# Patient Record
Sex: FEMALE | Race: WHITE | Employment: OTHER | URBAN - METROPOLITAN AREA
[De-identification: names, ages, dates, MRNs, and addresses within clinical notes are randomized per-mention and may not be internally consistent; named-entity substitution may affect disease eponyms.]

---

## 2017-01-17 ENCOUNTER — HOSPITAL ENCOUNTER (OUTPATIENT)
Dept: LAB | Age: 67
Discharge: HOME | End: 2017-01-17
Payer: MEDICARE

## 2017-01-17 DIAGNOSIS — C50.911 MALIGNANT NEOPLASM OF RIGHT FEMALE BREAST, UNSPECIFIED SITE OF BREAST: ICD-10-CM

## 2017-01-17 DIAGNOSIS — Z63.4 BEREAVEMENT: ICD-10-CM

## 2017-01-17 LAB
ALBUMIN SERPL BCP-MCNC: 3.8 G/DL (ref 3.2–4.6)
ALBUMIN/GLOB SERPL: 1.1 {RATIO} (ref 1.2–3.5)
ALP SERPL-CCNC: 83 U/L (ref 50–136)
ALT SERPL-CCNC: 26 U/L (ref 12–65)
ANION GAP BLD CALC-SCNC: 4 MMOL/L (ref 7–16)
AST SERPL W P-5'-P-CCNC: 20 U/L (ref 15–37)
BASOPHILS # BLD AUTO: 0 K/UL (ref 0–0.2)
BASOPHILS # BLD: 1 % (ref 0–2)
BILIRUB SERPL-MCNC: 0.5 MG/DL (ref 0.2–1.1)
BUN SERPL-MCNC: 21 MG/DL (ref 8–23)
CALCIUM SERPL-MCNC: 9.1 MG/DL (ref 8.3–10.4)
CANCER AG15-3 SERPL-ACNC: 13.1 U/ML (ref 1–35)
CHLORIDE SERPL-SCNC: 108 MMOL/L (ref 98–107)
CO2 SERPL-SCNC: 29 MMOL/L (ref 23–32)
CREAT SERPL-MCNC: 1.16 MG/DL (ref 0.6–1)
DIFFERENTIAL METHOD BLD: ABNORMAL
EOSINOPHIL # BLD: 0.2 K/UL (ref 0–0.8)
EOSINOPHIL NFR BLD: 5 % (ref 0.5–7.8)
ERYTHROCYTE [DISTWIDTH] IN BLOOD BY AUTOMATED COUNT: 12.6 % (ref 11.9–14.6)
GLOBULIN SER CALC-MCNC: 3.4 G/DL (ref 2.3–3.5)
GLUCOSE SERPL-MCNC: 129 MG/DL (ref 65–100)
HCT VFR BLD AUTO: 34 % (ref 35.8–46.3)
HGB BLD-MCNC: 11.1 G/DL (ref 11.7–15.4)
LYMPHOCYTES # BLD AUTO: 31 % (ref 13–44)
LYMPHOCYTES # BLD: 1.6 K/UL (ref 0.5–4.6)
MCH RBC QN AUTO: 31.7 PG (ref 26.1–32.9)
MCHC RBC AUTO-ENTMCNC: 32.6 G/DL (ref 31.4–35)
MCV RBC AUTO: 97.1 FL (ref 79.6–97.8)
MONOCYTES # BLD: 0.3 K/UL (ref 0.1–1.3)
MONOCYTES NFR BLD AUTO: 6 % (ref 4–12)
NEUTS SEG # BLD: 3 K/UL (ref 1.7–8.2)
NEUTS SEG NFR BLD AUTO: 58 % (ref 43–78)
NRBC # BLD: 0 K/UL (ref 0–0.2)
PLATELET # BLD AUTO: 159 K/UL (ref 150–450)
PMV BLD AUTO: 9.3 FL (ref 10.8–14.1)
POTASSIUM SERPL-SCNC: 4.3 MMOL/L (ref 3.5–5.1)
PROT SERPL-MCNC: 7.2 G/DL (ref 6.3–8.2)
RBC # BLD AUTO: 3.5 M/UL (ref 4.05–5.25)
SODIUM SERPL-SCNC: 141 MMOL/L (ref 136–145)
WBC # BLD AUTO: 5.1 K/UL (ref 4.3–11.1)

## 2017-01-17 PROCEDURE — 85025 COMPLETE CBC W/AUTO DIFF WBC: CPT | Performed by: INTERNAL MEDICINE

## 2017-01-17 PROCEDURE — 86300 IMMUNOASSAY TUMOR CA 15-3: CPT | Performed by: INTERNAL MEDICINE

## 2017-01-17 PROCEDURE — 80053 COMPREHEN METABOLIC PANEL: CPT | Performed by: INTERNAL MEDICINE

## 2017-01-17 PROCEDURE — 36415 COLL VENOUS BLD VENIPUNCTURE: CPT | Performed by: INTERNAL MEDICINE

## 2017-01-17 SDOH — SOCIAL STABILITY - SOCIAL INSECURITY: DISSAPEARANCE AND DEATH OF FAMILY MEMBER: Z63.4

## 2017-01-18 LAB — CANCER AG27-29 SERPL-ACNC: 23.4 U/ML (ref 0–38.6)

## 2017-07-21 ENCOUNTER — HOSPITAL ENCOUNTER (OUTPATIENT)
Dept: MAMMOGRAPHY | Age: 67
Discharge: HOME | End: 2017-07-21
Attending: INTERNAL MEDICINE
Payer: MEDICARE

## 2017-07-21 DIAGNOSIS — C50.911 MALIGNANT NEOPLASM OF RIGHT FEMALE BREAST, UNSPECIFIED SITE OF BREAST: ICD-10-CM

## 2017-07-21 DIAGNOSIS — Z12.31 ENCOUNTER FOR SCREENING MAMMOGRAM FOR BREAST CANCER: ICD-10-CM

## 2017-07-21 PROCEDURE — 77067 SCR MAMMO BI INCL CAD: CPT

## 2017-07-24 ENCOUNTER — HOSPITAL ENCOUNTER (OUTPATIENT)
Dept: LAB | Age: 67
Discharge: HOME | End: 2017-07-24
Payer: MEDICARE

## 2017-07-24 DIAGNOSIS — Z12.31 ENCOUNTER FOR SCREENING MAMMOGRAM FOR BREAST CANCER: ICD-10-CM

## 2017-07-24 DIAGNOSIS — C50.911 MALIGNANT NEOPLASM OF RIGHT FEMALE BREAST, UNSPECIFIED SITE OF BREAST: ICD-10-CM

## 2017-07-24 LAB
ALBUMIN SERPL BCP-MCNC: 4.1 G/DL (ref 3.2–4.6)
ALBUMIN/GLOB SERPL: 1.1 {RATIO} (ref 1.2–3.5)
ALP SERPL-CCNC: 99 U/L (ref 50–136)
ALT SERPL-CCNC: 27 U/L (ref 12–65)
ANION GAP BLD CALC-SCNC: 2 MMOL/L (ref 7–16)
AST SERPL W P-5'-P-CCNC: 22 U/L (ref 15–37)
BASOPHILS # BLD AUTO: 0 K/UL (ref 0–0.2)
BASOPHILS # BLD: 1 % (ref 0–2)
BILIRUB SERPL-MCNC: 0.6 MG/DL (ref 0.2–1.1)
BUN SERPL-MCNC: 23 MG/DL (ref 8–23)
CALCIUM SERPL-MCNC: 9.5 MG/DL (ref 8.3–10.4)
CANCER AG15-3 SERPL-ACNC: 14.5 U/ML (ref 1–35)
CHLORIDE SERPL-SCNC: 105 MMOL/L (ref 98–107)
CO2 SERPL-SCNC: 32 MMOL/L (ref 21–32)
CREAT SERPL-MCNC: 1.1 MG/DL (ref 0.6–1)
DIFFERENTIAL METHOD BLD: ABNORMAL
EOSINOPHIL # BLD: 0.2 K/UL (ref 0–0.8)
EOSINOPHIL NFR BLD: 5 % (ref 0.5–7.8)
ERYTHROCYTE [DISTWIDTH] IN BLOOD BY AUTOMATED COUNT: 12.3 % (ref 11.9–14.6)
GLOBULIN SER CALC-MCNC: 3.6 G/DL (ref 2.3–3.5)
GLUCOSE SERPL-MCNC: 92 MG/DL (ref 65–100)
HCT VFR BLD AUTO: 35.5 % (ref 35.8–46.3)
HGB BLD-MCNC: 12 G/DL (ref 11.7–15.4)
LYMPHOCYTES # BLD AUTO: 32 % (ref 13–44)
LYMPHOCYTES # BLD: 1.6 K/UL (ref 0.5–4.6)
MCH RBC QN AUTO: 32.1 PG (ref 26.1–32.9)
MCHC RBC AUTO-ENTMCNC: 33.8 G/DL (ref 31.4–35)
MCV RBC AUTO: 94.9 FL (ref 79.6–97.8)
MONOCYTES # BLD: 0.4 K/UL (ref 0.1–1.3)
MONOCYTES NFR BLD AUTO: 9 % (ref 4–12)
NEUTS SEG # BLD: 2.7 K/UL (ref 1.7–8.2)
NEUTS SEG NFR BLD AUTO: 54 % (ref 43–78)
NRBC # BLD: 0 K/UL (ref 0–0.2)
PLATELET # BLD AUTO: 190 K/UL (ref 150–450)
PMV BLD AUTO: 9.8 FL (ref 10.8–14.1)
POTASSIUM SERPL-SCNC: 4.6 MMOL/L (ref 3.5–5.1)
PROT SERPL-MCNC: 7.7 G/DL (ref 6.3–8.2)
RBC # BLD AUTO: 3.74 M/UL (ref 4.05–5.25)
SODIUM SERPL-SCNC: 139 MMOL/L (ref 136–145)
WBC # BLD AUTO: 4.9 K/UL (ref 4.3–11.1)

## 2017-07-24 PROCEDURE — 86300 IMMUNOASSAY TUMOR CA 15-3: CPT | Performed by: INTERNAL MEDICINE

## 2017-07-24 PROCEDURE — 85025 COMPLETE CBC W/AUTO DIFF WBC: CPT | Performed by: INTERNAL MEDICINE

## 2017-07-24 PROCEDURE — 36415 COLL VENOUS BLD VENIPUNCTURE: CPT | Performed by: INTERNAL MEDICINE

## 2017-07-24 PROCEDURE — 80053 COMPREHEN METABOLIC PANEL: CPT | Performed by: INTERNAL MEDICINE

## 2017-07-25 LAB — CANCER AG27-29 SERPL-ACNC: 24.3 U/ML (ref 0–38.6)

## 2017-08-01 ENCOUNTER — HOSPITAL ENCOUNTER (OUTPATIENT)
Dept: MAMMOGRAPHY | Age: 67
Discharge: HOME | End: 2017-08-01
Attending: INTERNAL MEDICINE
Payer: MEDICARE

## 2017-08-01 DIAGNOSIS — Z78.0 POST-MENOPAUSAL: ICD-10-CM

## 2017-08-01 DIAGNOSIS — C50.919 MALIGNANT NEOPLASM OF FEMALE BREAST, UNSPECIFIED LATERALITY, UNSPECIFIED SITE OF BREAST: ICD-10-CM

## 2017-08-01 PROCEDURE — 77080 DXA BONE DENSITY AXIAL: CPT

## 2017-09-29 ENCOUNTER — HOSPITAL ENCOUNTER (OUTPATIENT)
Dept: LAB | Age: 67
Discharge: HOME | End: 2017-09-29
Payer: MEDICARE

## 2017-09-29 DIAGNOSIS — R07.9 CHEST PAIN, UNSPECIFIED TYPE: ICD-10-CM

## 2017-09-29 DIAGNOSIS — R06.02 SHORTNESS OF BREATH: ICD-10-CM

## 2017-09-29 LAB — D DIMER PPP FEU-MCNC: 0.47 UG/ML(FEU)

## 2017-09-29 PROCEDURE — 36415 COLL VENOUS BLD VENIPUNCTURE: CPT | Performed by: INTERNAL MEDICINE

## 2017-09-29 PROCEDURE — 85379 FIBRIN DEGRADATION QUANT: CPT | Performed by: INTERNAL MEDICINE

## 2017-10-03 ENCOUNTER — HOSPITAL ENCOUNTER (EMERGENCY)
Age: 67
Discharge: HOME | End: 2017-10-03
Attending: EMERGENCY MEDICINE
Payer: MEDICARE

## 2017-10-03 ENCOUNTER — APPOINTMENT (OUTPATIENT)
Dept: GENERAL RADIOLOGY | Age: 67
End: 2017-10-03
Payer: MEDICARE

## 2017-10-03 ENCOUNTER — APPOINTMENT (OUTPATIENT)
Dept: CT IMAGING | Age: 67
End: 2017-10-03
Attending: EMERGENCY MEDICINE
Payer: MEDICARE

## 2017-10-03 VITALS
TEMPERATURE: 98.3 F | OXYGEN SATURATION: 98 % | HEIGHT: 60 IN | HEART RATE: 55 BPM | SYSTOLIC BLOOD PRESSURE: 156 MMHG | RESPIRATION RATE: 16 BRPM | WEIGHT: 140 LBS | BODY MASS INDEX: 27.48 KG/M2 | DIASTOLIC BLOOD PRESSURE: 65 MMHG

## 2017-10-03 DIAGNOSIS — R07.9 ACUTE CHEST PAIN: Primary | ICD-10-CM

## 2017-10-03 LAB
ALBUMIN SERPL-MCNC: 4.1 G/DL (ref 3.2–4.6)
ALBUMIN/GLOB SERPL: 1.1 {RATIO} (ref 1.2–3.5)
ALP SERPL-CCNC: 82 U/L (ref 50–136)
ALT SERPL-CCNC: 26 U/L (ref 12–65)
ANION GAP SERPL CALC-SCNC: 10 MMOL/L (ref 7–16)
AST SERPL-CCNC: 24 U/L (ref 15–37)
BASOPHILS # BLD: 0 K/UL (ref 0–0.2)
BASOPHILS NFR BLD: 0 % (ref 0–2)
BILIRUB SERPL-MCNC: 0.6 MG/DL (ref 0.2–1.1)
BNP SERPL-MCNC: 4 PG/ML
BUN SERPL-MCNC: 26 MG/DL (ref 8–23)
CALCIUM SERPL-MCNC: 9.2 MG/DL (ref 8.3–10.4)
CHLORIDE SERPL-SCNC: 104 MMOL/L (ref 98–107)
CO2 SERPL-SCNC: 24 MMOL/L (ref 21–32)
CREAT SERPL-MCNC: 1.4 MG/DL (ref 0.6–1)
D DIMER PPP FEU-MCNC: 0.63 UG/ML(FEU)
DIFFERENTIAL METHOD BLD: ABNORMAL
EOSINOPHIL # BLD: 0.2 K/UL (ref 0–0.8)
EOSINOPHIL NFR BLD: 4 % (ref 0.5–7.8)
ERYTHROCYTE [DISTWIDTH] IN BLOOD BY AUTOMATED COUNT: 12.4 % (ref 11.9–14.6)
GLOBULIN SER CALC-MCNC: 3.7 G/DL (ref 2.3–3.5)
GLUCOSE SERPL-MCNC: 101 MG/DL (ref 65–100)
HCT VFR BLD AUTO: 35 % (ref 35.8–46.3)
HGB BLD-MCNC: 12.3 G/DL (ref 11.7–15.4)
IMM GRANULOCYTES # BLD: 0 K/UL (ref 0–0.5)
IMM GRANULOCYTES NFR BLD: 0.2 % (ref 0–5)
LYMPHOCYTES # BLD: 1.9 K/UL (ref 0.5–4.6)
LYMPHOCYTES NFR BLD: 32 % (ref 13–44)
MCH RBC QN AUTO: 32.6 PG (ref 26.1–32.9)
MCHC RBC AUTO-ENTMCNC: 35.1 G/DL (ref 31.4–35)
MCV RBC AUTO: 92.8 FL (ref 79.6–97.8)
MONOCYTES # BLD: 0.6 K/UL (ref 0.1–1.3)
MONOCYTES NFR BLD: 10 % (ref 4–12)
NEUTS SEG # BLD: 3.1 K/UL (ref 1.7–8.2)
NEUTS SEG NFR BLD: 54 % (ref 43–78)
PLATELET # BLD AUTO: 214 K/UL (ref 150–450)
PMV BLD AUTO: 10 FL (ref 10.8–14.1)
POTASSIUM SERPL-SCNC: 4.9 MMOL/L (ref 3.5–5.1)
PROT SERPL-MCNC: 7.8 G/DL (ref 6.3–8.2)
RBC # BLD AUTO: 3.77 M/UL (ref 4.05–5.25)
SODIUM SERPL-SCNC: 138 MMOL/L (ref 136–145)
TROPONIN I SERPL-MCNC: <0.02 NG/ML (ref 0.02–0.05)
TROPONIN I SERPL-MCNC: <0.02 NG/ML (ref 0.02–0.05)
WBC # BLD AUTO: 5.8 K/UL (ref 4.3–11.1)

## 2017-10-03 PROCEDURE — 71020 XR CHEST PA LAT: CPT

## 2017-10-03 PROCEDURE — 84484 ASSAY OF TROPONIN QUANT: CPT | Performed by: PHYSICIAN ASSISTANT

## 2017-10-03 PROCEDURE — 93005 ELECTROCARDIOGRAM TRACING: CPT | Performed by: PHYSICIAN ASSISTANT

## 2017-10-03 PROCEDURE — 85025 COMPLETE CBC W/AUTO DIFF WBC: CPT | Performed by: PHYSICIAN ASSISTANT

## 2017-10-03 PROCEDURE — 83880 ASSAY OF NATRIURETIC PEPTIDE: CPT | Performed by: PHYSICIAN ASSISTANT

## 2017-10-03 PROCEDURE — 80053 COMPREHEN METABOLIC PANEL: CPT | Performed by: PHYSICIAN ASSISTANT

## 2017-10-03 PROCEDURE — 85379 FIBRIN DEGRADATION QUANT: CPT | Performed by: PHYSICIAN ASSISTANT

## 2017-10-03 PROCEDURE — 99285 EMERGENCY DEPT VISIT HI MDM: CPT | Performed by: EMERGENCY MEDICINE

## 2017-10-03 NOTE — ED PROVIDER NOTES
Patient is a 69 y.o. female presenting with chest pain. The history is provided by the patient. Chest Pain (Angina)    This is a new problem. The current episode started 3 to 5 hours ago. The problem has been gradually improving. Duration of episode(s) is 3 hours. The problem occurs rarely. The pain is associated with normal activity. The pain is present in the substernal region. The pain is at a severity of 7/10. The quality of the pain is described as pressure-like. The pain does not radiate. Exacerbated by: nothing. Pertinent negatives include no abdominal pain, no back pain, no claudication, no cough, no diaphoresis, no dizziness, no exertional chest pressure, no fever, no headaches, no hemoptysis, no irregular heartbeat, no leg pain, no lower extremity edema, no malaise/fatigue, no nausea, no near-syncope, no numbness, no orthopnea, no palpitations, no PND, no shortness of breath, no sputum production, no vomiting and no weakness. She has tried rest for the symptoms. The treatment provided no relief. Risk factors include hypertension. Her past medical history is significant for cancer, DVT and HTN. Her past medical history does not include aneurysm, DM, PE or CHF. Procedural history includes cardiac catheterization (2015) and echocardiogram (yesterday). Pertinent negatives include no cardiac stents.        Past Medical History:   Diagnosis Date    Acute cystitis     Acute venous embolism and thrombosis of deep vessels of distal lower extremity (HCC)     Allergic rhinitis     takes allegra daily    Anemia 10/5/2015    Arthritis     knees    Asthma     \"intrinsic asthma\" allergic to certain odors (no inhalers)    Bladder neck obstruction     Breast cancer ([address] dx 7/14    right breast lumpectomy    Cancer ([address]     Cardiomyopathy ([address]     Chronic pain     Coagulation disorder ([address]     anemia    Cystocele, midline     Depression 10/5/2015    Dyspnea 10/5/2015    Dysuria     Female stress incontinence     Female stress incontinence     Heart failure ([facility]     History of anemia 2013    History of Clostridium difficile infection 2006    History of gestational diabetes     History of vertigo     last episode 10/14/14; takes meclizine prn    Hyperactivity of bladder     takes med    Hypertension     controlled by medication    Ill-defined condition     Incomplete bladder emptying     Increased urinary frequency     takes med    Other chronic cystitis     Post-operative nausea and vomiting     many yrs ago after surgery    Postmenopausal atrophic vaginitis     Psychiatric disorder     cymbalta for stress & headaches    Seizures ([facility]     \"convusions at age 1\"  ? ?cause ? ?febrile seizure    Stress incontinence 10/5/2015    Thromboembolus ([facility] 1997, 2002    left leg \"DVT treated with elevation\"  \"no meds\"    Thromboembolus ([facility] 2006    right leg s/p cystocele (tx coumadin x 6 months    Urge incontinence     Urinary tract infection, site not specified     UTI (urinary tract infection)        Past Surgical History:   Procedure Laterality Date    BREAST SURGERY PROCEDURE UNLISTED Left 1994    breast benign lumpectomy    HX BREAST BIOPSY Right 7/14    HX BREAST LUMPECTOMY  8/15/2014    RIGHT BREAST NEEDLE LOCALIZED LUMPECTOMY    PREOP @ 10:00 / NUC MED 11:45 / NEEDLE LOC 2:00 / SURGERY @ 4:30  performed by Lor Verduzco MD at [facility] HX HERNIA REPAIR Right 2002    ventral    HX HYSTERECTOMY  1973    vaginal    HX OTHER SURGICAL      PORT PLACEMENT    HX UROLOGICAL  2006    cystocele repair    HX UROLOGICAL  2000    cystocele repair    HX UROLOGICAL  2001    adhesions and nerve pain due to cystocele    HX UROLOGICAL  2005    cystocele and rectocele repair    HX UROLOGICAL      vaginal sling urethropexy    HX VASCULAR ACCESS           Family History:   Problem Relation Age of Onset    Heart Disease Father     Hypertension Father     Coronary Artery Disease Father  Cancer Sister     Breast Cancer Sister     Heart Disease Sister     Heart Disease Mother     Hypertension Other      gen fam hx    Diabetes Other      gen fam hx    Heart Disease Other      gen fam hx       Social History     Social History    Marital status:      Spouse name: N/A    Number of children: N/A    Years of education: N/A     Occupational History    Not on file. Social History Main Topics    Smoking status: Never Smoker    Smokeless tobacco: Never Used    Alcohol use 0.5 oz/week     1 Glasses of wine per week      Comment: \"very seldom\"    Drug use: No    Sexual activity: Not on file     Other Topics Concern    Not on file     Social History Narrative         ALLERGIES: Avc vaginal [sulfanilamide]; Benadryl [diphenhydramine hcl]; Clindamycin; Doxycycline; Elavil; Ibuprofen; Keflex [cephalexin]; Macrobid [nitrofurantoin monohyd/m-cryst]; Other plant, animal, environmental; Pcn [penicillins]; Sulfa (sulfonamide antibiotics); and Trimethoprim    Review of Systems   Constitutional: Negative for diaphoresis, fever and malaise/fatigue. Respiratory: Negative for cough, hemoptysis, sputum production and shortness of breath. Cardiovascular: Positive for chest pain. Negative for palpitations, orthopnea, claudication, PND and near-syncope. Gastrointestinal: Negative for abdominal pain, nausea and vomiting. Musculoskeletal: Negative for back pain. Neurological: Negative for dizziness, weakness, numbness and headaches. All other systems reviewed and are negative. Vitals:    10/03/17 1607 10/03/17 1621 10/03/17 1808   BP:  103/69 115/57   Pulse: 70     Resp: 16     Temp: 98.3 °F (36.8 °C)     SpO2: 98%     Weight: 63.5 kg (140 lb)     Height: 5' (1.524 m)              Physical Exam   Constitutional: She is oriented to person, place, and time. She appears well-developed and well-nourished. No distress. HENT:   Head: Normocephalic and atraumatic.    Right Ear: Tympanic membrane and external ear normal.   Left Ear: Tympanic membrane and external ear normal.   Mouth/Throat: Oropharynx is clear and moist.   Eyes: Conjunctivae and EOM are normal. Pupils are equal, round, and reactive to light. Neck: Normal range of motion. Neck supple. No tracheal deviation present. Cardiovascular: Normal rate, regular rhythm, normal heart sounds and intact distal pulses. Exam reveals no gallop and no friction rub. No murmur heard. Pulmonary/Chest: Effort normal and breath sounds normal. No respiratory distress. She has no wheezes. Abdominal: Soft. Bowel sounds are normal. She exhibits no distension and no mass. There is no hepatosplenomegaly. There is no tenderness. There is no rebound and no guarding. Musculoskeletal: Normal range of motion. She exhibits no edema. Lymphadenopathy:     She has no cervical adenopathy. Neurological: She is alert and oriented to person, place, and time. She displays normal reflexes. No cranial nerve deficit. Skin: Skin is warm and dry. No rash noted. She is not diaphoretic. No erythema. Psychiatric: She has a normal mood and affect. Nursing note and vitals reviewed. MDM  Number of Diagnoses or Management Options  Acute chest pain: new and requires workup     Amount and/or Complexity of Data Reviewed  Clinical lab tests: ordered and reviewed  Tests in the radiology section of CPT®: ordered and reviewed  Decide to obtain previous medical records or to obtain history from someone other than the patient: yes  Review and summarize past medical records: yes (8/19/15 patient had a normal heart catheterization except for mildly elevated pulmonary pressures.   Echocardiogram done yesterday revealed an EF of 50% and was otherwise unremarkable.)  Independent visualization of images, tracings, or specimens: yes    Risk of Complications, Morbidity, and/or Mortality  Presenting problems: high  Diagnostic procedures: moderate  Management options: moderate    Patient Progress  Patient progress: improved    ED Course   Comment By Time   Lissette Moore is a 69 y.o. female here for chest pain/pressure. History of decreased ejection fracture after chemo 2 years ago, normal now. Rapid assessment performed. --- Orders were placed. --- Patient will be roomed. I have seen and briefly evaluated the patient in triage in order to expedite their workup and treatment as defined by the department policy and procedure. Their care will be completed in the emergency department by another provider. The work up will not be complete until this further evaluation is completed by another provider. Signed By: JAS Vallejo    October 3, 2017 [facility]803       Procedures    The patient was observed in the ED. Chest pain is atypical and the d-dimer when age-adjusted is normal.  Plan is to have the patient follow-up with her family doctor tomorrow with a phone call.     Results Reviewed:      Recent Results (from the past 24 hour(s))   EKG, 12 LEAD, INITIAL    Collection Time: 10/03/17  4:04 PM   Result Value Ref Range    Ventricular Rate 69 BPM    Atrial Rate 69 BPM    P-R Interval 144 ms    QRS Duration 80 ms    Q-T Interval 402 ms    QTC Calculation (Bezet) 430 ms    Calculated P Axis 55 degrees    Calculated R Axis 32 degrees    Calculated T Axis 67 degrees    Diagnosis       Normal sinus rhythm  Normal ECG  When compared with ECG of 15-AUG-2015 12:54,  No significant change was found     CBC WITH AUTOMATED DIFF    Collection Time: 10/03/17  4:19 PM   Result Value Ref Range    WBC 5.8 4.3 - 11.1 K/uL    RBC 3.77 (L) 4.05 - 5.25 M/uL    HGB 12.3 11.7 - 15.4 g/dL    HCT 35.0 (L) 35.8 - 46.3 %    MCV 92.8 79.6 - 97.8 FL    MCH 32.6 26.1 - 32.9 PG    MCHC 35.1 (H) 31.4 - 35.0 g/dL    RDW 12.4 11.9 - 14.6 %    PLATELET 861 424 - 912 K/uL    MPV 10.0 (L) 10.8 - 14.1 FL    DF AUTOMATED      NEUTROPHILS 54 43 - 78 %    LYMPHOCYTES 32 13 - 44 %    MONOCYTES 10 4.0 - 12.0 %    EOSINOPHILS 4 0.5 - 7.8 %    BASOPHILS 0 0.0 - 2.0 %    IMMATURE GRANULOCYTES 0.2 0.0 - 5.0 %    ABS. NEUTROPHILS 3.1 1.7 - 8.2 K/UL    ABS. LYMPHOCYTES 1.9 0.5 - 4.6 K/UL    ABS. MONOCYTES 0.6 0.1 - 1.3 K/UL    ABS. EOSINOPHILS 0.2 0.0 - 0.8 K/UL    ABS. BASOPHILS 0.0 0.0 - 0.2 K/UL    ABS. IMM. GRANS. 0.0 0.0 - 0.5 K/UL   METABOLIC PANEL, COMPREHENSIVE    Collection Time: 10/03/17  4:19 PM   Result Value Ref Range    Sodium 138 136 - 145 mmol/L    Potassium 4.9 3.5 - 5.1 mmol/L    Chloride 104 98 - 107 mmol/L    CO2 24 21 - 32 mmol/L    Anion gap 10 7 - 16 mmol/L    Glucose 101 (H) 65 - 100 mg/dL    BUN 26 (H) 8 - 23 MG/DL    Creatinine 1.40 (H) 0.6 - 1.0 MG/DL    GFR est AA 48 (L) >60 ml/min/1.73m2    GFR est non-AA 40 (L) >60 ml/min/1.73m2    Calcium 9.2 8.3 - 10.4 MG/DL    Bilirubin, total 0.6 0.2 - 1.1 MG/DL    ALT (SGPT) 26 12 - 65 U/L    AST (SGOT) 24 15 - 37 U/L    Alk. phosphatase 82 50 - 136 U/L    Protein, total 7.8 6.3 - 8.2 g/dL    Albumin 4.1 3.2 - 4.6 g/dL    Globulin 3.7 (H) 2.3 - 3.5 g/dL    A-G Ratio 1.1 (L) 1.2 - 3.5     TROPONIN I    Collection Time: 10/03/17  4:19 PM   Result Value Ref Range    Troponin-I, Qt. <0.02 (L) 0.02 - 0.05 NG/ML   BNP    Collection Time: 10/03/17  4:19 PM   Result Value Ref Range    BNP 4 pg/mL   D DIMER    Collection Time: 10/03/17  4:20 PM   Result Value Ref Range    D DIMER 0.63 (HH) <0.55 ug/ml(FEU)   TROPONIN I    Collection Time: 10/03/17  7:48 PM   Result Value Ref Range    Troponin-I, Qt. <0.02 (L) 0.02 - 0.05 NG/ML       I discussed the results of all labs, procedures, radiographs, and treatments with the patient and available family. Treatment plan is agreed upon and the patient is ready for discharge. All voiced understanding of the discharge plan and medication instructions or changes as appropriate. Questions about treatment in the ED were answered.   All were encouraged to return should symptoms worsen or new problems develop.

## 2017-10-03 NOTE — ED TRIAGE NOTES
Pt. Complains of chest pain, states it feels like a \"weight\" is on her chest. Pt. Complains of some shortness of breath at times.

## 2017-10-04 LAB
ATRIAL RATE: 69 BPM
CALCULATED P AXIS, ECG09: 55 DEGREES
CALCULATED R AXIS, ECG10: 32 DEGREES
CALCULATED T AXIS, ECG11: 67 DEGREES
DIAGNOSIS, 93000: NORMAL
P-R INTERVAL, ECG05: 144 MS
Q-T INTERVAL, ECG07: 402 MS
QRS DURATION, ECG06: 80 MS
QTC CALCULATION (BEZET), ECG08: 430 MS
VENTRICULAR RATE, ECG03: 69 BPM

## 2017-10-04 NOTE — DISCHARGE INSTRUCTIONS
Chest Pain: Care Instructions  Your Care Instructions  There are many things that can cause chest pain. Some are not serious and will get better on their own in a few days. But some kinds of chest pain need more testing and treatment. Your doctor may have recommended a follow-up visit in the next 8 to 12 hours. If you are not getting better, you may need more tests or treatment. Even though your doctor has released you, you still need to watch for any problems. The doctor carefully checked you, but sometimes problems can develop later. If you have new symptoms or if your symptoms do not get better, get medical care right away. If you have worse or different chest pain or pressure that lasts more than 5 minutes or you passed out (lost consciousness), call 911 or seek other emergency help right away. A medical visit is only one step in your treatment. Even if you feel better, you still need to do what your doctor recommends, such as going to all suggested follow-up appointments and taking medicines exactly as directed. This will help you recover and help prevent future problems. How can you care for yourself at home? · Rest until you feel better. · Take your medicine exactly as prescribed. Call your doctor if you think you are having a problem with your medicine. · Do not drive after taking a prescription pain medicine. When should you call for help? Call 911 if:  · You passed out (lost consciousness). · You have severe difficulty breathing. · You have symptoms of a heart attack. These may include:  ¨ Chest pain or pressure, or a strange feeling in your chest.  ¨ Sweating. ¨ Shortness of breath. ¨ Nausea or vomiting. ¨ Pain, pressure, or a strange feeling in your back, neck, jaw, or upper belly or in one or both shoulders or arms. ¨ Lightheadedness or sudden weakness. ¨ A fast or irregular heartbeat.   After you call 911, the  may tell you to chew 1 adult-strength or 2 to 4 low-dose aspirin. Wait for an ambulance. Do not try to drive yourself. Call your doctor today if:  · You have any trouble breathing. · Your chest pain gets worse. · You are dizzy or lightheaded, or you feel like you may faint. · You are not getting better as expected. · You are having new or different chest pain. Where can you learn more? Go to http://kingsley-cliff.info/. Enter A120 in the search box to learn more about \"Chest Pain: Care Instructions. \"  Current as of: March 20, 2017  Content Version: 11.3  © 0473-3678 [employer]. Care instructions adapted under license by [employer] (which disclaims liability or warranty for this information). If you have questions about a medical condition or this instruction, always ask your healthcare professional. [address] any warranty or liability for your use of this information.

## 2017-10-04 NOTE — ED NOTES
Patient is resting on stretcher. Patient is on cardiac monitor, cycling vital signs, and continuous pulse ox. Patient denies needs at this time. Stretcher in low position. Side rails x 2 for safety. Will continue to monitor.

## 2017-10-04 NOTE — ED NOTES
I have reviewed discharge instructions with the patient. The patient verbalized understanding. The patient is ambulatory upon exit and is in no acute distress. The patient has discharge instructions and follow up information in hand. The patient does not have any questions at this time.

## 2017-10-09 ENCOUNTER — PATIENT OUTREACH (OUTPATIENT)
Dept: CASE MANAGEMENT | Age: 67
End: 2017-10-09

## 2017-10-09 NOTE — PROGRESS NOTES
Date/Time of Call:       10/04/17 11:00AM   What was the patient seen in the ED for? Patient was seen in ED for diagnosis of Acute chest pain   Does the patient understand his/her diagnosis and/or treatment and what happened during the ED visit? Spoke with patient who stated understanding of treatment and diagnosis. Patient denies any further c/o chest pain. Did the patient receive discharge instructions from the ED? Patient stated receiving d/c instructions from the ED. Review any discharge instructions (see notes in [facility]). Ask patient if they understand these. Do they have any questions? Patient and Care Coordinator reviewed DC instructions. Patient stated understanding and no questions asked. Were home services ordered (nursing, PT, OT, ST, etc.)? No HH ordered at d/c   If so, has the first visit occurred? If not, why? (Assist with coordination of services if necessary.) N/A   DME ordered at d/c? No DME ordered at d/c. If so, has it been received? If not, why?  (Assist with coordination of arranging DME orders if necessary.) N/A   Complete a review of all medications (new, continued and discontinued meds per the D/C instructions and medication tab in [address] [place] [address]). Review of medications has been completed by Patient and Care Coordinator. Were all new prescriptions filled? If not, why?  (Assist with obtainment of medications if necessary.) N/A   Does the patient understand the purpose and dosing instructions for all medications? (If patient has questions, provide explanation and education.) Patient stated understanding of purpose, and dosing instructions for all medications. Does the patient have any problems in performing ADLs? (If patient is unable to perform ADLs  what is the limiting factor(s)?   Do they have a support system that can assist? If no support system is present, discuss possible assistance that they may be able to obtain.) Patient states she is independent with all ADLs. Does the patient have all follow-up appointments scheduled? Has transportation been arranged? 7 day f/up with PCP?    7-14 day f/up with specialist?    If f/up has not been made  what actions has the care coordinator made to accomplish this? Has transportation been arranged? [facility] follow-up should be within 7 days of discharge; all others should have PCP follow-up within 7 days of discharge; follow-ups with other specialists as appropriate or ordered.) Patient states she has PCP f/u 10/5 and Cardiology f/u 10/5 as well. Pt. declines need for transportation. Any other questions or concerns expressed by the patient? No other questions asked. No further needs identified. Gratitude expressed for care and call. TOMMY Call Completed By: Izaiah Hernandez LPN   Care Coordinator  [facility]                                 This note will not be viewable in [address]

## 2018-01-24 ENCOUNTER — HOSPITAL ENCOUNTER (OUTPATIENT)
Dept: LAB | Age: 68
Discharge: HOME | End: 2018-01-24
Payer: MEDICARE

## 2018-01-24 DIAGNOSIS — Z78.0 POST-MENOPAUSAL: ICD-10-CM

## 2018-01-24 DIAGNOSIS — C50.919 MALIGNANT NEOPLASM OF FEMALE BREAST (HCC): ICD-10-CM

## 2018-01-24 LAB
ALBUMIN SERPL-MCNC: 3.9 G/DL (ref 3.2–4.6)
ALBUMIN/GLOB SERPL: 1.2 {RATIO} (ref 1.2–3.5)
ALP SERPL-CCNC: 71 U/L (ref 50–136)
ALT SERPL-CCNC: 25 U/L (ref 12–65)
ANION GAP SERPL CALC-SCNC: 9 MMOL/L (ref 7–16)
AST SERPL-CCNC: 19 U/L (ref 15–37)
BASOPHILS # BLD: 0 K/UL (ref 0–0.2)
BASOPHILS NFR BLD: 1 % (ref 0–2)
BILIRUB SERPL-MCNC: 0.6 MG/DL (ref 0.2–1.1)
BUN SERPL-MCNC: 34 MG/DL (ref 8–23)
CALCIUM SERPL-MCNC: 9.5 MG/DL (ref 8.3–10.4)
CANCER AG15-3 SERPL-ACNC: 11.1 U/ML (ref 1–35)
CHLORIDE SERPL-SCNC: 104 MMOL/L (ref 98–107)
CO2 SERPL-SCNC: 27 MMOL/L (ref 21–32)
CREAT SERPL-MCNC: 1.39 MG/DL (ref 0.6–1)
DIFFERENTIAL METHOD BLD: ABNORMAL
EOSINOPHIL # BLD: 0.3 K/UL (ref 0–0.8)
EOSINOPHIL NFR BLD: 6 % (ref 0.5–7.8)
ERYTHROCYTE [DISTWIDTH] IN BLOOD BY AUTOMATED COUNT: 12.2 % (ref 11.9–14.6)
GLOBULIN SER CALC-MCNC: 3.3 G/DL (ref 2.3–3.5)
GLUCOSE SERPL-MCNC: 147 MG/DL (ref 65–100)
HCT VFR BLD AUTO: 33.2 % (ref 35.8–46.3)
HGB BLD-MCNC: 11.3 G/DL (ref 11.7–15.4)
LYMPHOCYTES # BLD: 1.4 K/UL (ref 0.5–4.6)
LYMPHOCYTES NFR BLD: 29 % (ref 13–44)
MAGNESIUM SERPL-MCNC: 1.7 MG/DL (ref 1.8–2.4)
MCH RBC QN AUTO: 32.5 PG (ref 26.1–32.9)
MCHC RBC AUTO-ENTMCNC: 34 G/DL (ref 31.4–35)
MCV RBC AUTO: 95.4 FL (ref 79.6–97.8)
MONOCYTES # BLD: 0.4 K/UL (ref 0.1–1.3)
MONOCYTES NFR BLD: 8 % (ref 4–12)
NEUTS SEG # BLD: 2.7 K/UL (ref 1.7–8.2)
NEUTS SEG NFR BLD: 56 % (ref 43–78)
NRBC # BLD: 0 K/UL (ref 0–0.2)
NRBC BLD-RTO: 0 PER 100 WBC (ref 0–2)
PLATELET # BLD AUTO: 168 K/UL (ref 150–450)
PMV BLD AUTO: 10.2 FL (ref 10.8–14.1)
POTASSIUM SERPL-SCNC: 4.1 MMOL/L (ref 3.5–5.1)
PROT SERPL-MCNC: 7.2 G/DL (ref 6.3–8.2)
RBC # BLD AUTO: 3.48 M/UL (ref 4.05–5.25)
SODIUM SERPL-SCNC: 140 MMOL/L (ref 136–145)
WBC # BLD AUTO: 4.8 K/UL (ref 4.3–11.1)

## 2018-01-24 PROCEDURE — 85025 COMPLETE CBC W/AUTO DIFF WBC: CPT | Performed by: INTERNAL MEDICINE

## 2018-01-24 PROCEDURE — 83735 ASSAY OF MAGNESIUM: CPT | Performed by: INTERNAL MEDICINE

## 2018-01-24 PROCEDURE — 36415 COLL VENOUS BLD VENIPUNCTURE: CPT | Performed by: INTERNAL MEDICINE

## 2018-01-24 PROCEDURE — 86300 IMMUNOASSAY TUMOR CA 15-3: CPT | Performed by: INTERNAL MEDICINE

## 2018-01-24 PROCEDURE — 80053 COMPREHEN METABOLIC PANEL: CPT | Performed by: INTERNAL MEDICINE

## 2018-01-25 LAB — CANCER AG27-29 SERPL-ACNC: 21 U/ML (ref 0–38.6)

## 2018-01-26 PROBLEM — N95.2 ATROPHIC VAGINITIS: Status: ACTIVE | Noted: 2018-01-26

## 2018-06-11 ENCOUNTER — HOSPITAL ENCOUNTER (OUTPATIENT)
Dept: CT IMAGING | Age: 68
Discharge: HOME | End: 2018-06-11
Attending: INTERNAL MEDICINE
Payer: MEDICARE

## 2018-06-11 DIAGNOSIS — R06.02 SHORTNESS OF BREATH: ICD-10-CM

## 2018-06-11 PROCEDURE — 71250 CT THORAX DX C-: CPT

## 2018-07-23 ENCOUNTER — HOSPITAL ENCOUNTER (OUTPATIENT)
Dept: MAMMOGRAPHY | Age: 68
Discharge: HOME | End: 2018-07-23
Attending: INTERNAL MEDICINE
Payer: MEDICARE

## 2018-07-23 DIAGNOSIS — Z12.31 ENCOUNTER FOR SCREENING MAMMOGRAM FOR BREAST CANCER: ICD-10-CM

## 2018-07-23 PROCEDURE — 77067 SCR MAMMO BI INCL CAD: CPT

## 2018-07-24 ENCOUNTER — HOSPITAL ENCOUNTER (OUTPATIENT)
Dept: LAB | Age: 68
Discharge: HOME | End: 2018-07-24
Payer: MEDICARE

## 2018-07-24 DIAGNOSIS — C50.919 MALIGNANT NEOPLASM OF FEMALE BREAST, UNSPECIFIED ESTROGEN RECEPTOR STATUS, UNSPECIFIED LATERALITY, UNSPECIFIED SITE OF BREAST (HCC): ICD-10-CM

## 2018-07-24 LAB
ALBUMIN SERPL-MCNC: 4 G/DL (ref 3.2–4.6)
ALBUMIN/GLOB SERPL: 1.1 {RATIO} (ref 1.2–3.5)
ALP SERPL-CCNC: 71 U/L (ref 50–136)
ALT SERPL-CCNC: 21 U/L (ref 12–65)
ANION GAP SERPL CALC-SCNC: 9 MMOL/L (ref 7–16)
AST SERPL-CCNC: 15 U/L (ref 15–37)
BASOPHILS # BLD: 0 K/UL (ref 0–0.2)
BASOPHILS NFR BLD: 1 % (ref 0–2)
BILIRUB SERPL-MCNC: 0.6 MG/DL (ref 0.2–1.1)
BUN SERPL-MCNC: 36 MG/DL (ref 8–23)
CALCIUM SERPL-MCNC: 9.7 MG/DL (ref 8.3–10.4)
CHLORIDE SERPL-SCNC: 107 MMOL/L (ref 98–107)
CO2 SERPL-SCNC: 23 MMOL/L (ref 21–32)
CREAT SERPL-MCNC: 1.64 MG/DL (ref 0.6–1)
DIFFERENTIAL METHOD BLD: ABNORMAL
EOSINOPHIL # BLD: 0.8 K/UL (ref 0–0.8)
EOSINOPHIL NFR BLD: 11 % (ref 0.5–7.8)
ERYTHROCYTE [DISTWIDTH] IN BLOOD BY AUTOMATED COUNT: 11.9 % (ref 11.9–14.6)
GLOBULIN SER CALC-MCNC: 3.6 G/DL (ref 2.3–3.5)
GLUCOSE SERPL-MCNC: 141 MG/DL (ref 65–100)
HCT VFR BLD AUTO: 34.7 % (ref 35.8–46.3)
HGB BLD-MCNC: 11.3 G/DL (ref 11.7–15.4)
LYMPHOCYTES # BLD: 1.6 K/UL (ref 0.5–4.6)
LYMPHOCYTES NFR BLD: 23 % (ref 13–44)
MCH RBC QN AUTO: 32 PG (ref 26.1–32.9)
MCHC RBC AUTO-ENTMCNC: 32.6 G/DL (ref 31.4–35)
MCV RBC AUTO: 98.3 FL (ref 79.6–97.8)
MONOCYTES # BLD: 0.6 K/UL (ref 0.1–1.3)
MONOCYTES NFR BLD: 8 % (ref 4–12)
NEUTS SEG # BLD: 4 K/UL (ref 1.7–8.2)
NEUTS SEG NFR BLD: 57 % (ref 43–78)
NRBC # BLD: 0 K/UL (ref 0–0.2)
PLATELET # BLD AUTO: 201 K/UL (ref 150–450)
PMV BLD AUTO: 9.8 FL (ref 10.8–14.1)
POTASSIUM SERPL-SCNC: 4.6 MMOL/L (ref 3.5–5.1)
PROT SERPL-MCNC: 7.6 G/DL (ref 6.3–8.2)
RBC # BLD AUTO: 3.53 M/UL (ref 4.05–5.25)
SODIUM SERPL-SCNC: 139 MMOL/L (ref 136–145)
WBC # BLD AUTO: 7.1 K/UL (ref 4.3–11.1)

## 2018-07-24 PROCEDURE — 80053 COMPREHEN METABOLIC PANEL: CPT | Performed by: INTERNAL MEDICINE

## 2018-07-24 PROCEDURE — 36415 COLL VENOUS BLD VENIPUNCTURE: CPT | Performed by: INTERNAL MEDICINE

## 2018-07-24 PROCEDURE — 85025 COMPLETE CBC W/AUTO DIFF WBC: CPT | Performed by: INTERNAL MEDICINE

## 2018-10-22 PROCEDURE — 88112 CYTOPATH CELL ENHANCE TECH: CPT

## 2018-10-23 ENCOUNTER — HOSPITAL ENCOUNTER (OUTPATIENT)
Dept: LAB | Age: 68
Discharge: HOME | End: 2018-10-23

## 2018-10-31 ENCOUNTER — ANESTHESIA EVENT (OUTPATIENT)
Dept: SURGERY | Age: 68
End: 2018-10-31
Payer: MEDICARE

## 2018-10-31 ENCOUNTER — HOSPITAL ENCOUNTER (OUTPATIENT)
Dept: SURGERY | Age: 68
Discharge: HOME | End: 2018-10-31
Payer: MEDICARE

## 2018-10-31 VITALS
DIASTOLIC BLOOD PRESSURE: 60 MMHG | HEART RATE: 70 BPM | RESPIRATION RATE: 16 BRPM | WEIGHT: 135 LBS | TEMPERATURE: 98.1 F | OXYGEN SATURATION: 100 % | HEIGHT: 60 IN | SYSTOLIC BLOOD PRESSURE: 126 MMHG | BODY MASS INDEX: 26.5 KG/M2

## 2018-10-31 LAB
ANION GAP SERPL CALC-SCNC: 4 MMOL/L (ref 7–16)
BUN SERPL-MCNC: 28 MG/DL (ref 8–23)
CALCIUM SERPL-MCNC: 9.3 MG/DL (ref 8.3–10.4)
CHLORIDE SERPL-SCNC: 112 MMOL/L (ref 98–107)
CO2 SERPL-SCNC: 25 MMOL/L (ref 21–32)
CREAT SERPL-MCNC: 1.31 MG/DL (ref 0.6–1)
ERYTHROCYTE [DISTWIDTH] IN BLOOD BY AUTOMATED COUNT: 12 %
GLUCOSE SERPL-MCNC: 91 MG/DL (ref 65–100)
HCT VFR BLD AUTO: 34 % (ref 35.8–46.3)
HGB BLD-MCNC: 10.9 G/DL (ref 11.7–15.4)
MCH RBC QN AUTO: 31.5 PG (ref 26.1–32.9)
MCHC RBC AUTO-ENTMCNC: 32.1 G/DL (ref 31.4–35)
MCV RBC AUTO: 98.3 FL (ref 79.6–97.8)
NRBC # BLD: 0 K/UL (ref 0–0.2)
PLATELET # BLD AUTO: 229 K/UL (ref 150–450)
PMV BLD AUTO: 10.3 FL (ref 9.4–12.3)
POTASSIUM SERPL-SCNC: 4.6 MMOL/L (ref 3.5–5.1)
RBC # BLD AUTO: 3.46 M/UL (ref 4.05–5.2)
SODIUM SERPL-SCNC: 141 MMOL/L (ref 136–145)
WBC # BLD AUTO: 8 K/UL (ref 4.3–11.1)

## 2018-10-31 PROCEDURE — 80048 BASIC METABOLIC PNL TOTAL CA: CPT

## 2018-10-31 PROCEDURE — 85027 COMPLETE CBC AUTOMATED: CPT

## 2018-10-31 RX ORDER — FENTANYL CITRATE 50 UG/ML
100 INJECTION, SOLUTION INTRAMUSCULAR; INTRAVENOUS ONCE
Status: CANCELLED | OUTPATIENT
Start: 2018-10-31 | End: 2018-10-31

## 2018-10-31 RX ORDER — MIDAZOLAM HYDROCHLORIDE 1 MG/ML
2 INJECTION, SOLUTION INTRAMUSCULAR; INTRAVENOUS
Status: CANCELLED | OUTPATIENT
Start: 2018-10-31 | End: 2018-11-01

## 2018-10-31 NOTE — PERIOP NOTES
Patient verified name and  Order for consent  found in EHR and matches case posting; patient verified. Type 1B surgery, pre assessment complete. Labs per surgeon: CBC, BMP; results Labs per anesthesia protocol: POC glucose EKG: NA Hibiclens and instructions given per hospital policy. Patient provided with and instructed on educational handouts including Guide to Surgery, Pain Management, Hand Hygiene, Blood Transfusion Education, and [place] [facility] Brochure. Patient answered medical/surgical history questions at their best of ability. All prior to admission medications documented in [facility]. Original medication prescription bottle not visualized during patient appointment. Patient instructed to hold all vitamins 7 days prior to surgery and NSAIDS 5 days prior to surgery, patient verbalized understanding. Medications to be held: xarelto- permission given by Dr Dora Jones 
 
Patient instructed to continue previous medications as prescribed prior to surgery and to take the following medications the day of surgery according to anesthesia guidelines with a small sip of water: welchole, enablex, cymbalta, aromasin. Patient teach back successful and patient demonstrates knowledge of instructions.

## 2018-11-01 ENCOUNTER — ANESTHESIA (OUTPATIENT)
Dept: SURGERY | Age: 68
End: 2018-11-01
Payer: MEDICARE

## 2018-11-01 ENCOUNTER — HOSPITAL ENCOUNTER (OUTPATIENT)
Age: 68
Setting detail: OUTPATIENT SURGERY
Discharge: HOME | End: 2018-11-01
Attending: UROLOGY | Admitting: UROLOGY
Payer: MEDICARE

## 2018-11-01 VITALS
DIASTOLIC BLOOD PRESSURE: 62 MMHG | WEIGHT: 136.5 LBS | HEART RATE: 54 BPM | SYSTOLIC BLOOD PRESSURE: 139 MMHG | OXYGEN SATURATION: 94 % | TEMPERATURE: 98 F | RESPIRATION RATE: 16 BRPM | BODY MASS INDEX: 26.66 KG/M2

## 2018-11-01 DIAGNOSIS — N95.2 ATROPHIC VAGINITIS: Primary | ICD-10-CM

## 2018-11-01 LAB — GLUCOSE BLD STRIP.AUTO-MCNC: 107 MG/DL (ref 65–100)

## 2018-11-01 PROCEDURE — 76210000006 HC OR PH I REC 0.5 TO 1 HR: Performed by: UROLOGY

## 2018-11-01 PROCEDURE — 74011250637 HC RX REV CODE- 250/637: Performed by: ANESTHESIOLOGY

## 2018-11-01 PROCEDURE — 76010000149 HC OR TIME 1 TO 1.5 HR: Performed by: UROLOGY

## 2018-11-01 PROCEDURE — 74011250636 HC RX REV CODE- 250/636

## 2018-11-01 PROCEDURE — 74011250636 HC RX REV CODE- 250/636: Performed by: ANESTHESIOLOGY

## 2018-11-01 PROCEDURE — 74011250637 HC RX REV CODE- 250/637: Performed by: UROLOGY

## 2018-11-01 PROCEDURE — 77030033269 HC SLV COMPR SCD KNE2 CARD -B: Performed by: UROLOGY

## 2018-11-01 PROCEDURE — 77030020782 HC GWN BAIR PAWS FLX 3M -B: Performed by: ANESTHESIOLOGY

## 2018-11-01 PROCEDURE — 88305 TISSUE EXAM BY PATHOLOGIST: CPT

## 2018-11-01 PROCEDURE — 76060000033 HC ANESTHESIA 1 TO 1.5 HR: Performed by: UROLOGY

## 2018-11-01 PROCEDURE — 77030020143 HC AIRWY LARYN INTUB CGAS -A: Performed by: ANESTHESIOLOGY

## 2018-11-01 PROCEDURE — 77030034696 HC CATH URETH FOL 2W BARD -A: Performed by: UROLOGY

## 2018-11-01 PROCEDURE — 82962 GLUCOSE BLOOD TEST: CPT

## 2018-11-01 PROCEDURE — 77030019927 HC TBNG IRR CYSTO BAXT -A: Performed by: UROLOGY

## 2018-11-01 PROCEDURE — 77030010545: Performed by: UROLOGY

## 2018-11-01 PROCEDURE — 74011250636 HC RX REV CODE- 250/636: Performed by: UROLOGY

## 2018-11-01 PROCEDURE — 77030018830 HC SOL IRR GLYC ICUM-A: Performed by: UROLOGY

## 2018-11-01 PROCEDURE — 76210000026 HC REC RM PH II 1 TO 1.5 HR: Performed by: UROLOGY

## 2018-11-01 RX ORDER — ATROPA BELLADONNA AND OPIUM 16.2; 6 MG/1; MG/1
SUPPOSITORY RECTAL AS NEEDED
Status: DISCONTINUED | OUTPATIENT
Start: 2018-11-01 | End: 2018-11-01 | Stop reason: HOSPADM

## 2018-11-01 RX ORDER — LIDOCAINE HYDROCHLORIDE 20 MG/ML
INJECTION, SOLUTION EPIDURAL; INFILTRATION; INTRACAUDAL; PERINEURAL AS NEEDED
Status: DISCONTINUED | OUTPATIENT
Start: 2018-11-01 | End: 2018-11-01 | Stop reason: HOSPADM

## 2018-11-01 RX ORDER — ACETAMINOPHEN 10 MG/ML
1000 INJECTION, SOLUTION INTRAVENOUS
Status: DISCONTINUED | OUTPATIENT
Start: 2018-11-01 | End: 2018-11-02 | Stop reason: HOSPADM

## 2018-11-01 RX ORDER — PROPOFOL 10 MG/ML
INJECTION, EMULSION INTRAVENOUS AS NEEDED
Status: DISCONTINUED | OUTPATIENT
Start: 2018-11-01 | End: 2018-11-01 | Stop reason: HOSPADM

## 2018-11-01 RX ORDER — CIPROFLOXACIN 2 MG/ML
400 INJECTION, SOLUTION INTRAVENOUS
Status: COMPLETED | OUTPATIENT
Start: 2018-11-01 | End: 2018-11-01

## 2018-11-01 RX ORDER — SODIUM CHLORIDE, SODIUM LACTATE, POTASSIUM CHLORIDE, CALCIUM CHLORIDE 600; 310; 30; 20 MG/100ML; MG/100ML; MG/100ML; MG/100ML
150 INJECTION, SOLUTION INTRAVENOUS CONTINUOUS
Status: DISCONTINUED | OUTPATIENT
Start: 2018-11-01 | End: 2018-11-02 | Stop reason: HOSPADM

## 2018-11-01 RX ORDER — ONDANSETRON 2 MG/ML
INJECTION INTRAMUSCULAR; INTRAVENOUS AS NEEDED
Status: DISCONTINUED | OUTPATIENT
Start: 2018-11-01 | End: 2018-11-01 | Stop reason: HOSPADM

## 2018-11-01 RX ORDER — FENTANYL CITRATE 50 UG/ML
INJECTION, SOLUTION INTRAMUSCULAR; INTRAVENOUS AS NEEDED
Status: DISCONTINUED | OUTPATIENT
Start: 2018-11-01 | End: 2018-11-01 | Stop reason: HOSPADM

## 2018-11-01 RX ORDER — SODIUM CHLORIDE 9 MG/ML
50 INJECTION, SOLUTION INTRAVENOUS CONTINUOUS
Status: DISCONTINUED | OUTPATIENT
Start: 2018-11-01 | End: 2018-11-02 | Stop reason: HOSPADM

## 2018-11-01 RX ORDER — SODIUM CHLORIDE, SODIUM LACTATE, POTASSIUM CHLORIDE, CALCIUM CHLORIDE 600; 310; 30; 20 MG/100ML; MG/100ML; MG/100ML; MG/100ML
150 INJECTION, SOLUTION INTRAVENOUS CONTINUOUS
Status: DISCONTINUED | OUTPATIENT
Start: 2018-11-01 | End: 2018-11-01 | Stop reason: HOSPADM

## 2018-11-01 RX ORDER — HYDROCODONE BITARTRATE AND ACETAMINOPHEN 5; 325 MG/1; MG/1
1 TABLET ORAL AS NEEDED
Status: DISCONTINUED | OUTPATIENT
Start: 2018-11-01 | End: 2018-11-02 | Stop reason: HOSPADM

## 2018-11-01 RX ORDER — FAMOTIDINE 20 MG/1
20 TABLET, FILM COATED ORAL ONCE
Status: COMPLETED | OUTPATIENT
Start: 2018-11-01 | End: 2018-11-01

## 2018-11-01 RX ORDER — SODIUM CHLORIDE 0.9 % (FLUSH) 0.9 %
5-10 SYRINGE (ML) INJECTION AS NEEDED
Status: DISCONTINUED | OUTPATIENT
Start: 2018-11-01 | End: 2018-11-01 | Stop reason: HOSPADM

## 2018-11-01 RX ORDER — HYDROMORPHONE HYDROCHLORIDE 2 MG/ML
0.5 INJECTION, SOLUTION INTRAMUSCULAR; INTRAVENOUS; SUBCUTANEOUS
Status: DISCONTINUED | OUTPATIENT
Start: 2018-11-01 | End: 2018-11-02 | Stop reason: HOSPADM

## 2018-11-01 RX ORDER — LIDOCAINE HYDROCHLORIDE 10 MG/ML
0.1 INJECTION INFILTRATION; PERINEURAL AS NEEDED
Status: DISCONTINUED | OUTPATIENT
Start: 2018-11-01 | End: 2018-11-01 | Stop reason: HOSPADM

## 2018-11-01 RX ORDER — SODIUM CHLORIDE 0.9 % (FLUSH) 0.9 %
5-10 SYRINGE (ML) INJECTION AS NEEDED
Status: DISCONTINUED | OUTPATIENT
Start: 2018-11-01 | End: 2018-11-02 | Stop reason: HOSPADM

## 2018-11-01 RX ORDER — SODIUM CHLORIDE 0.9 % (FLUSH) 0.9 %
5-10 SYRINGE (ML) INJECTION EVERY 8 HOURS
Status: DISCONTINUED | OUTPATIENT
Start: 2018-11-01 | End: 2018-11-01 | Stop reason: HOSPADM

## 2018-11-01 RX ORDER — ACETAMINOPHEN 500 MG
1000 TABLET ORAL
Status: DISCONTINUED | OUTPATIENT
Start: 2018-11-01 | End: 2018-11-02 | Stop reason: HOSPADM

## 2018-11-01 RX ORDER — HYDROCODONE BITARTRATE AND ACETAMINOPHEN 5; 325 MG/1; MG/1
1-2 TABLET ORAL
Qty: 20 TAB | Refills: 0 | Status: SHIPPED | OUTPATIENT
Start: 2018-11-01 | End: 2018-12-11

## 2018-11-01 RX ADMIN — PROPOFOL 160 MG: 10 INJECTION, EMULSION INTRAVENOUS at 17:48

## 2018-11-01 RX ADMIN — FAMOTIDINE 20 MG: 20 TABLET ORAL at 14:18

## 2018-11-01 RX ADMIN — CIPROFLOXACIN 400 MG: 2 INJECTION, SOLUTION INTRAVENOUS at 17:56

## 2018-11-01 RX ADMIN — FENTANYL CITRATE 25 MCG: 50 INJECTION, SOLUTION INTRAMUSCULAR; INTRAVENOUS at 18:09

## 2018-11-01 RX ADMIN — HYDROCODONE BITARTRATE AND ACETAMINOPHEN 1 TABLET: 5; 325 TABLET ORAL at 19:58

## 2018-11-01 RX ADMIN — FENTANYL CITRATE 25 MCG: 50 INJECTION, SOLUTION INTRAMUSCULAR; INTRAVENOUS at 18:06

## 2018-11-01 RX ADMIN — ONDANSETRON 4 MG: 2 INJECTION INTRAMUSCULAR; INTRAVENOUS at 18:42

## 2018-11-01 RX ADMIN — LIDOCAINE HYDROCHLORIDE 80 MG: 20 INJECTION, SOLUTION EPIDURAL; INFILTRATION; INTRACAUDAL; PERINEURAL at 17:48

## 2018-11-01 RX ADMIN — SODIUM CHLORIDE, SODIUM LACTATE, POTASSIUM CHLORIDE, AND CALCIUM CHLORIDE 150 ML/HR: 600; 310; 30; 20 INJECTION, SOLUTION INTRAVENOUS at 14:18

## 2018-11-01 NOTE — BRIEF OP NOTE
BRIEF OPERATIVE NOTE Date of Procedure: 11/1/2018 Preoperative Diagnosis: * No pre-op diagnosis entered * Postoperative Diagnosis: Urge incontinence [N39.41] Pyuria [N39.0] Procedure(s): 
CYSTOSCOPY WITH BLADDER BIOPSIES / HYDRODESTENTION/ TRIMMING OF VAGINAL MESH Surgeon(s) and Role: Yakelin Hair MD - Primary Surgical Assistant: none Surgical Staff: 
Circ-1: Kiesha Kaur RN Event Time In Time Out Incision Start 6381 Incision Close 1837 Anesthesia: General  
Estimated Blood Loss: minimal 
Specimens:  
ID Type Source Tests Collected by Time Destination 1 : BLADDER BIOPSIES Preservative Bladder  Thierry Sainz MD 11/1/2018 Candace Christianson Pathology Findings: see op note Complications: none Implants: * No implants in log *

## 2018-11-01 NOTE — ANESTHESIA POSTPROCEDURE EVALUATION
Procedure(s): 
CYSTOSCOPY WITH BLADDER BIOPSIES / HYDRODESTENTION/ TRIMMING OF VAGINAL MESH. Anesthesia Post Evaluation Multimodal analgesia: multimodal analgesia used between 6 hours prior to anesthesia start to PACU discharge Patient location during evaluation: PACU Patient participation: complete - patient participated Level of consciousness: awake and alert Pain management: adequate Airway patency: patent Anesthetic complications: no 
Cardiovascular status: acceptable Respiratory status: acceptable Hydration status: acceptable Comments: Nausea controlled Visit Vitals /63 Pulse (!) 52 Temp 36.6 °C (97.8 °F) Resp 16 Wt 61.9 kg (136 lb 8 oz) SpO2 97% BMI 26.66 kg/m²

## 2018-11-01 NOTE — DISCHARGE INSTRUCTIONS
Office will call to schedule catheter removal.     Hold Xarelto until then. Remove vaginal packing in the morning. Bladder Augmentation: What to Expect at Home  Your Recovery  Bladder augmentation is surgery to make the bladder larger and improve its ability to stretch. After surgery, your bladder should be able to hold more urine. After surgery, you may feel weak and tired at first. You will probably feel some pain or cramping in your lower belly and need pain medicine for a week or two. Try to avoid heavy lifting and strenuous activities that might put extra pressure on your bladder while you recover. This care sheet gives you a general idea about how long it will take for you to recover. But each person recovers at a different pace. Follow the steps below to get better as quickly as possible. How can you care for yourself at home? Activity  1. Rest when you feel tired. Getting enough sleep will help you recover. 2. Try to walk each day. Start by walking a little more than you did the day before. Bit by bit, increase the amount you walk. Walking boosts blood flow and helps prevent pneumonia and constipation. 3. Avoid strenuous activities, such as bicycle riding, jogging, weight lifting, or aerobic exercise, for 6 to 8 weeks or until your doctor says it is okay. 4. For 6 to 8 weeks or until your doctor says it is okay, avoid lifting anything that would make you strain. This may include a child, heavy grocery bags and milk containers, a heavy briefcase or backpack, cat litter or dog food bags, or a vacuum . 5. Ask your doctor when you can drive again. 6. You will probably need to take 72 hours off from work. It depends on the type of work you do and how you feel. 7. You may shower as usual. Pat the cut (incision) dry. Do not take a bath until your doctor tells you it is okay. 8. Ask your doctor when it is okay for you to have sex. Diet  1. You can eat your normal diet.  If your stomach is upset, try bland, low-fat foods like plain rice, broiled chicken, toast, and yogurt. 2. Drink plenty of fluids (unless your doctor tells you not to). 3. You may notice that your bowel movements are not regular right after your surgery. This is common. Try to avoid constipation and straining with bowel movements. You may want to take a fiber supplement every day. If you have not had a bowel movement after a couple of days, ask your doctor about taking a mild laxative. Medicines  1. Your doctor will tell you if and when you can restart your medicines. He or she will also give you instructions about taking any new medicines. 2. If you take blood thinners, such as warfarin (Coumadin), clopidogrel (Plavix), or aspirin, be sure to talk to your doctor. He or she will tell you if and when to start taking those medicines again. Make sure that you understand exactly what your doctor wants you to do. 3. Be safe with medicines. Take pain medicines exactly as directed. 1. If the doctor gave you a prescription medicine for pain, take it as prescribed. 2. If you are not taking a prescription pain medicine, ask your doctor if you can take an over-the-counter medicine. 4. If you think your pain medicine is making you sick to your stomach:  1. Take your medicine after meals (unless your doctor has told you not to). 2. Ask your doctor for a different pain medicine. 5. If your doctor prescribed antibiotics, take them as directed. Do not stop taking them just because you feel better. You need to take the full course of antibiotics. Other instructions  · If your doctor has told you to flush your bladder, follow his or her instructions on how to do this. Follow-up care is a key part of your treatment and safety. Be sure to make and go to all appointments, and call your doctor if you are having problems. It's also a good idea to know your test results and keep a list of the medicines you take.   When should you call for help?  Call 911 anytime you think you may need emergency care. For example, call if:  · You passed out (lost consciousness). · You have severe trouble breathing. · You have sudden chest pain and shortness of breath, or you cough up blood. · You have severe pain in your belly. Call your doctor now or seek immediate medical care if:  · You have bright red vaginal bleeding that soaks one or more pads in an hour, or you have large clots. · You are sick to your stomach or cannot keep fluids down. · You have pain that does not get better after you take pain medicine. · You have loose stitches, or your incision comes open. · Your incision is bleeding. · You have vaginal discharge that has increased in amount or smells bad. · Your catheters come out. · Your catheters are not draining urine, even after you flush your bladder. · You have signs of infection, such as:  ¨ Increased pain, swelling, warmth, or redness. ¨ Red streaks leading from the incision. ¨ Pus draining from the incision. ¨ A fever. · You have clots of blood in your urine. · You have trouble passing urine or stool, especially if you have pain or swelling in your lower belly. · You have new or worse pain when you urinate. · You have pain in your back just below your rib cage. This is called flank pain. Watch closely for changes in your health, and be sure to contact your doctor if:  You do not have a bowel movement after taking a laxative. After general anesthesia or intravenous sedation, for 24 hours or while taking prescription Narcotics:  · Limit your activities  · Do not drive and operate hazardous machinery  · Do not make important personal or business decisions  · Do  not drink alcoholic beverages  · If you have not urinated within 8 hours after discharge, please contact your surgeon on call. *  Please give a list of your current medications to your Primary Care Provider.   *  Please update this list whenever your medications are discontinued, doses are      changed, or new medications (including over-the-counter products) are added. *  Please carry medication information at all times in case of emergency situations. These are general instructions for a healthy lifestyle:  No smoking/ No tobacco products/ Avoid exposure to second hand smoke  Surgeon General's Warning:  Quitting smoking now greatly reduces serious risk to your health. Obesity, smoking, and sedentary lifestyle greatly increases your risk for illness  A healthy diet, regular physical exercise & weight monitoring are important for maintaining a healthy lifestyle    You may be retaining fluid if you have a history of heart failure or if you experience any of the following symptoms:  Weight gain of 3 pounds or more overnight or 5 pounds in a week, increased swelling in our hands or feet or shortness of breath while lying flat in bed. Please call your doctor as soon as you notice any of these symptoms; do not wait until your next office visit. Recognize signs and symptoms of STROKE:    F-face looks uneven  A-arms unable to move or move unevenly  S-speech slurred or non-existent  T-time-call 911 as soon as signs and symptoms begin-DO NOT go       Back to bed or wait to see if you get better-TIME IS BRAIN. An indwelling Montero Catheter is a tube that empties urine from your bladder into a drainage bag. To prevent blockage of the catheter and contamination of the urine, it is important that you follow a few guidelines in caring for your catheter:    1. Empty your drainage bag once every 8 hours or as soon as it fills. 2.  Empty the bag by loosening the clamp on the end of the bag (leg or bedside bag). Do not touch the tip of the tube. After draining the urine into the toilet, you may clean the tip with a povidone-iodine (Betadine) solution. Wash hands well before and after emptying drainage bag.    3.  Always keep the drainage bag lower than the catheter. Remember that urine will not drain uphill or against gravity. Check the tubing for kinks, since urine will not drain past a kink. 4. Flush your bladder by drinking plenty of liquids. Clear liquids and water are ideal; remember to drink at least 8 glasses of water each day. 5.  It is important to clean around your meatus every day and after having a bowel movement (blake. Female)  while you have an indwelling kirkland catheter. Using a soapy wash cloth, wash area thoroughly and rinse, using a forward to backward motion being careful not to introduce bacteria  around catheter. 6.  If you are to keep your catheter for an extended period of time, you may be given a leg bag that attaches to your thigh or calf with Velcro straps. If you are sent home with more than one bag, you will be given instructions on how to care for the bags and change them.

## 2018-11-01 NOTE — PERIOP NOTES
Patient arrived to main pre-op via stretcher and transport-- pt is alert and oriented. No complaints at this time. Consents are signed/ H&P plan matches consent and is updated. Patient has friend at bedside. No stated needs at this time.

## 2018-11-01 NOTE — PROGRESS NOTES
TRANSFER - OUT REPORT: 
 
Verbal report given to [id number] [place] [address] 28 RN(name) on J Luis Garcia  being transferred to Main preop(unit) for ordered procedure Report consisted of patients Situation, Background, Assessment and  
Recommendations(SBAR). Information from the following report(s) SBAR was reviewed with the receiving nurse. Lines:  
Peripheral IV 11/01/18 Left Hand (Active) Site Assessment Clean, dry, & intact 11/1/2018  2:14 PM  
Phlebitis Assessment 0 11/1/2018  2:14 PM  
Infiltration Assessment 0 11/1/2018  2:14 PM  
Dressing Status Clean, dry, & intact 11/1/2018  2:14 PM  
Hub Color/Line Status Pink 11/1/2018  2:14 PM  
Action Taken Blood drawn 11/1/2018  2:14 PM  
  
 
Opportunity for questions and clarification was provided. Patient transported with: 
 [employer]

## 2018-11-02 NOTE — OP NOTES
[facility] REPORT    Elia Richardson  MR#: [id number]  : 1950  ACCOUNT #: [de-identified]   DATE OF SERVICE: 2018    PREOPERATIVE DIAGNOSIS:  Cystitis. POSTOPERATIVE DIAGNOSES:  Interstitial cystitis and exposed vaginal mesh. PROCEDURE PERFORMED:  Cystoscopy, hydrodistention of bladder, bladder biopsy, excision of vaginal mesh measuring 1 cm in length    FINDINGS:  Bladder shows diffuse areas of cystitis and after hydrodistention, there are ulcerated areas in the mucosa consistent with Hunner's ulcers. Also, an area of exposed vaginal mesh on the left distal anterior vaginal wall, which was excised completely. DESCRIPTION OF PROCEDURE:  The patient given a general anesthetic, placed in the dorsal lithotomy position. She was prepped and draped in sterile fashion. I passed a 25-Kazakh cystoscope in the urethra using the video camera and 30-degree lens. The bladder shows some sediment and diffuse areas of red mucosa consistent with cystitis. Both ureteral orifices were noted to be normal.  There were no stones or tumors. I did not see any fistula or evidence of foreign body. We distended the bladder with the irrigation back held 80 cm anterior to the level of the bladder. It was held distended for 8 minutes and then allowed to drain. Inspection after hydrodistention shows where the mucosa had split in several areas on the right lateral and right anterior wall consistent with Hunner's ulcers. A biopsy forceps was used to take a single biopsy from the right posterior wall of the bladder and it was sent in formalin. The bleeding ulcers in the biopsy site were cauterized with a Bugbee electrode. A Montero catheter was placed and was irrigated until it was light pink. The bimanual digital exam of the vagina shows a possible exposed mesh on the left anterior distal vaginal wall.   Patient was placed in a Trendelenburg position and a weighted vaginal speculum was placed in the posterior vagina. I was able to see the exposed piece of mesh. It look like the arm from one of the previous cystocele repairs. I was able to pull this out and cut both ends of it so that there was no evidence of any exposed mesh. At this point, we placed a vaginal packing. Montero was left indwelling. She was taken to the recovery room in stable condition. PLAN:  She will be discharged home. We will leave the Montero until next week. ANESTHESIA:  General.    SPECIMENS REMOVED:  Bladder biopsy. IMPLANTS:  None. SURGEON:  Ander Borja MD    ASSISTANT:  None. ESTIMATED BLOOD LOSS:  Minimal.    COMPLICATIONS:  None.       MD Dianelys Duffy / JW  D: 11/01/2018 18:50     T: 11/02/2018 06:19  JOB #: [id number]

## 2019-01-29 ENCOUNTER — HOSPITAL ENCOUNTER (OUTPATIENT)
Dept: LAB | Age: 69
Discharge: HOME | End: 2019-01-29
Payer: MEDICARE

## 2019-01-29 DIAGNOSIS — C50.919 MALIGNANT NEOPLASM OF FEMALE BREAST, UNSPECIFIED ESTROGEN RECEPTOR STATUS, UNSPECIFIED LATERALITY, UNSPECIFIED SITE OF BREAST (HCC): ICD-10-CM

## 2019-01-29 LAB
ALBUMIN SERPL-MCNC: 4 G/DL (ref 3.2–4.6)
ALBUMIN/GLOB SERPL: 1.1 {RATIO} (ref 1.2–3.5)
ALP SERPL-CCNC: 74 U/L (ref 50–136)
ALT SERPL-CCNC: 23 U/L (ref 12–65)
ANION GAP SERPL CALC-SCNC: 3 MMOL/L (ref 7–16)
AST SERPL-CCNC: 15 U/L (ref 15–37)
BASOPHILS # BLD: 0.1 K/UL (ref 0–0.2)
BASOPHILS NFR BLD: 1 % (ref 0–2)
BILIRUB SERPL-MCNC: 0.6 MG/DL (ref 0.2–1.1)
BUN SERPL-MCNC: 28 MG/DL (ref 8–23)
CALCIUM SERPL-MCNC: 9.7 MG/DL (ref 8.3–10.4)
CHLORIDE SERPL-SCNC: 109 MMOL/L (ref 98–107)
CO2 SERPL-SCNC: 27 MMOL/L (ref 21–32)
CREAT SERPL-MCNC: 1.44 MG/DL (ref 0.6–1)
DIFFERENTIAL METHOD BLD: ABNORMAL
EOSINOPHIL # BLD: 0.7 K/UL (ref 0–0.8)
EOSINOPHIL NFR BLD: 12 % (ref 0.5–7.8)
ERYTHROCYTE [DISTWIDTH] IN BLOOD BY AUTOMATED COUNT: 12.2 % (ref 11.9–14.6)
GLOBULIN SER CALC-MCNC: 3.5 G/DL (ref 2.3–3.5)
GLUCOSE SERPL-MCNC: 121 MG/DL (ref 65–100)
HCT VFR BLD AUTO: 36.2 % (ref 35.8–46.3)
HGB BLD-MCNC: 12 G/DL (ref 11.7–15.4)
IMM GRANULOCYTES # BLD AUTO: 0 K/UL (ref 0–0.5)
IMM GRANULOCYTES NFR BLD AUTO: 0 % (ref 0–5)
LYMPHOCYTES # BLD: 1.7 K/UL (ref 0.5–4.6)
LYMPHOCYTES NFR BLD: 28 % (ref 13–44)
MCH RBC QN AUTO: 31.7 PG (ref 26.1–32.9)
MCHC RBC AUTO-ENTMCNC: 33.1 G/DL (ref 31.4–35)
MCV RBC AUTO: 95.8 FL (ref 79.6–97.8)
MONOCYTES # BLD: 0.5 K/UL (ref 0.1–1.3)
MONOCYTES NFR BLD: 8 % (ref 4–12)
NEUTS SEG # BLD: 3 K/UL (ref 1.7–8.2)
NEUTS SEG NFR BLD: 51 % (ref 43–78)
NRBC # BLD: 0 K/UL (ref 0–0.2)
PLATELET # BLD AUTO: 174 K/UL (ref 150–450)
PLATELET COMMENTS,PCOM: ADEQUATE
PMV BLD AUTO: 9.8 FL (ref 9.4–12.3)
POTASSIUM SERPL-SCNC: 4.3 MMOL/L (ref 3.5–5.1)
PROT SERPL-MCNC: 7.5 G/DL (ref 6.3–8.2)
RBC # BLD AUTO: 3.78 M/UL (ref 4.05–5.25)
RBC MORPH BLD: ABNORMAL
SODIUM SERPL-SCNC: 139 MMOL/L (ref 136–145)
WBC # BLD AUTO: 6 K/UL (ref 4.3–11.1)
WBC MORPH BLD: ABNORMAL

## 2019-01-29 PROCEDURE — 85025 COMPLETE CBC W/AUTO DIFF WBC: CPT

## 2019-01-29 PROCEDURE — 36415 COLL VENOUS BLD VENIPUNCTURE: CPT

## 2019-01-29 PROCEDURE — 80053 COMPREHEN METABOLIC PANEL: CPT

## 2019-08-02 ENCOUNTER — HOSPITAL ENCOUNTER (OUTPATIENT)
Dept: MAMMOGRAPHY | Age: 69
Discharge: HOME | End: 2019-08-02
Attending: INTERNAL MEDICINE
Payer: MEDICARE

## 2019-08-02 DIAGNOSIS — C50.919 MALIGNANT NEOPLASM OF FEMALE BREAST, UNSPECIFIED ESTROGEN RECEPTOR STATUS, UNSPECIFIED LATERALITY, UNSPECIFIED SITE OF BREAST (HCC): ICD-10-CM

## 2019-08-02 DIAGNOSIS — Z12.31 ENCOUNTER FOR SCREENING MAMMOGRAM FOR BREAST CANCER: ICD-10-CM

## 2019-08-02 DIAGNOSIS — Z79.810 LONG TERM (CURRENT) USE OF SELECTIVE ESTROGEN RECEPTOR MODULATORS (SERMS): ICD-10-CM

## 2019-08-02 DIAGNOSIS — Z78.0 POST-MENOPAUSAL: ICD-10-CM

## 2019-08-02 PROCEDURE — 77080 DXA BONE DENSITY AXIAL: CPT

## 2019-08-02 PROCEDURE — 77067 SCR MAMMO BI INCL CAD: CPT

## 2019-08-05 ENCOUNTER — HOSPITAL ENCOUNTER (OUTPATIENT)
Dept: LAB | Age: 69
Discharge: HOME | End: 2019-08-05
Payer: MEDICARE

## 2019-08-05 DIAGNOSIS — C50.919 MALIGNANT NEOPLASM OF FEMALE BREAST, UNSPECIFIED ESTROGEN RECEPTOR STATUS, UNSPECIFIED LATERALITY, UNSPECIFIED SITE OF BREAST (HCC): ICD-10-CM

## 2019-08-05 LAB
ALBUMIN SERPL-MCNC: 3.9 G/DL (ref 3.2–4.6)
ALBUMIN/GLOB SERPL: 1.1 {RATIO} (ref 1.2–3.5)
ALP SERPL-CCNC: 67 U/L (ref 50–136)
ALT SERPL-CCNC: 22 U/L (ref 12–65)
ANION GAP SERPL CALC-SCNC: 6 MMOL/L (ref 7–16)
AST SERPL-CCNC: 16 U/L (ref 15–37)
BASOPHILS # BLD: 0 K/UL (ref 0–0.2)
BASOPHILS NFR BLD: 1 % (ref 0–2)
BILIRUB SERPL-MCNC: 0.5 MG/DL (ref 0.2–1.1)
BUN SERPL-MCNC: 34 MG/DL (ref 8–23)
CALCIUM SERPL-MCNC: 9.8 MG/DL (ref 8.3–10.4)
CHLORIDE SERPL-SCNC: 109 MMOL/L (ref 98–107)
CO2 SERPL-SCNC: 24 MMOL/L (ref 21–32)
CREAT SERPL-MCNC: 1.29 MG/DL (ref 0.6–1)
DIFFERENTIAL METHOD BLD: ABNORMAL
EOSINOPHIL # BLD: 0.5 K/UL (ref 0–0.8)
EOSINOPHIL NFR BLD: 9 % (ref 0.5–7.8)
ERYTHROCYTE [DISTWIDTH] IN BLOOD BY AUTOMATED COUNT: 12.2 % (ref 11.9–14.6)
GLOBULIN SER CALC-MCNC: 3.7 G/DL (ref 2.3–3.5)
GLUCOSE SERPL-MCNC: 112 MG/DL (ref 65–100)
HCT VFR BLD AUTO: 34.3 % (ref 35.8–46.3)
HGB BLD-MCNC: 11.4 G/DL (ref 11.7–15.4)
IMM GRANULOCYTES # BLD AUTO: 0 K/UL (ref 0–0.5)
IMM GRANULOCYTES NFR BLD AUTO: 0 % (ref 0–5)
LYMPHOCYTES # BLD: 1.2 K/UL (ref 0.5–4.6)
LYMPHOCYTES NFR BLD: 19 % (ref 13–44)
MCH RBC QN AUTO: 31.7 PG (ref 26.1–32.9)
MCHC RBC AUTO-ENTMCNC: 33.2 G/DL (ref 31.4–35)
MCV RBC AUTO: 95.3 FL (ref 79.6–97.8)
MONOCYTES # BLD: 0.6 K/UL (ref 0.1–1.3)
MONOCYTES NFR BLD: 9 % (ref 4–12)
NEUTS SEG # BLD: 3.9 K/UL (ref 1.7–8.2)
NEUTS SEG NFR BLD: 63 % (ref 43–78)
NRBC # BLD: 0 K/UL (ref 0–0.2)
PLATELET # BLD AUTO: 164 K/UL (ref 150–450)
PMV BLD AUTO: 9.6 FL (ref 9.4–12.3)
POTASSIUM SERPL-SCNC: 4.3 MMOL/L (ref 3.5–5.1)
PROT SERPL-MCNC: 7.6 G/DL (ref 6.3–8.2)
RBC # BLD AUTO: 3.6 M/UL (ref 4.05–5.25)
SODIUM SERPL-SCNC: 139 MMOL/L (ref 136–145)
WBC # BLD AUTO: 6.2 K/UL (ref 4.3–11.1)

## 2019-08-05 PROCEDURE — 36415 COLL VENOUS BLD VENIPUNCTURE: CPT

## 2019-08-05 PROCEDURE — 80053 COMPREHEN METABOLIC PANEL: CPT

## 2019-08-05 PROCEDURE — 85025 COMPLETE CBC W/AUTO DIFF WBC: CPT

## 2019-08-12 PROBLEM — M81.0 OSTEOPOROSIS: Status: ACTIVE | Noted: 2019-08-12

## 2019-08-19 ENCOUNTER — HOSPITAL ENCOUNTER (OUTPATIENT)
Dept: INFUSION THERAPY | Age: 69
Discharge: HOME | End: 2019-08-19
Payer: MEDICARE

## 2019-08-19 VITALS
OXYGEN SATURATION: 100 % | SYSTOLIC BLOOD PRESSURE: 130 MMHG | DIASTOLIC BLOOD PRESSURE: 58 MMHG | BODY MASS INDEX: 26.95 KG/M2 | HEART RATE: 69 BPM | TEMPERATURE: 97.8 F | RESPIRATION RATE: 18 BRPM | WEIGHT: 138 LBS

## 2019-08-19 DIAGNOSIS — M81.0 OSTEOPOROSIS, UNSPECIFIED OSTEOPOROSIS TYPE, UNSPECIFIED PATHOLOGICAL FRACTURE PRESENCE: Primary | ICD-10-CM

## 2019-08-19 PROCEDURE — 96374 THER/PROPH/DIAG INJ IV PUSH: CPT

## 2019-08-19 PROCEDURE — 74011250636 HC RX REV CODE- 250/636: Performed by: INTERNAL MEDICINE

## 2019-08-19 RX ORDER — ZOLEDRONIC ACID 5 MG/100ML
5 INJECTION, SOLUTION INTRAVENOUS ONCE
Status: COMPLETED | OUTPATIENT
Start: 2019-08-19 | End: 2019-08-19

## 2019-08-19 RX ADMIN — ZOLEDRONIC ACID 5 MG: 5 INJECTION, SOLUTION INTRAVENOUS at 14:10

## 2019-08-19 NOTE — PROGRESS NOTES
Arrived to the [facility]. reclast completed. Patient tolerated well. Any issues or concerns during appointment: no.  Patient aware of next infusion appointment on  2/15/20 with Dr Mike Marcos. Discharged to home ambulatory.

## 2020-01-19 ENCOUNTER — HOSPITAL ENCOUNTER (EMERGENCY)
Age: 70
Discharge: HOME | End: 2020-01-19
Attending: EMERGENCY MEDICINE
Payer: MEDICARE

## 2020-01-19 ENCOUNTER — APPOINTMENT (OUTPATIENT)
Dept: GENERAL RADIOLOGY | Age: 70
End: 2020-01-19
Attending: EMERGENCY MEDICINE
Payer: MEDICARE

## 2020-01-19 VITALS
HEART RATE: 65 BPM | SYSTOLIC BLOOD PRESSURE: 121 MMHG | RESPIRATION RATE: 16 BRPM | DIASTOLIC BLOOD PRESSURE: 63 MMHG | TEMPERATURE: 98.1 F | HEIGHT: 60 IN | OXYGEN SATURATION: 100 % | BODY MASS INDEX: 27.68 KG/M2 | WEIGHT: 141 LBS
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
## 2020-01-19 DIAGNOSIS — S13.4XXA WHIPLASH INJURY TO NECK, INITIAL ENCOUNTER: ICD-10-CM

## 2020-01-19 PROCEDURE — 73562 X-RAY EXAM OF KNEE 3: CPT

## 2020-01-19 PROCEDURE — 99283 EMERGENCY DEPT VISIT LOW MDM: CPT
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
## 2020-01-20 NOTE — DISCHARGE INSTRUCTIONS
Based on your exam I do not feel that we need to perform any imaging  You will be increasingly sore and stiff over the next few days  Continue using over-the-counter medications but your symptoms should improve over the next week or so.
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
## 2020-01-20 NOTE — ED NOTES
I have reviewed discharge instructions with the patient. The patient verbalized understanding. Patient left ED via Discharge Method: ambulatory to Home with friend. Opportunity for questions and clarification provided. Patient given 0 scripts. To continue your aftercare when you leave the hospital, you may receive an automated call from our care team to check in on how you are doing. This is a free service and part of our promise to provide the best care and service to meet your aftercare needs.  If you have questions, or wish to unsubscribe from this service please call [phone]. Thank you for Choosing our [facility] Emergency Department.

## 2020-02-05 ENCOUNTER — HOSPITAL ENCOUNTER (OUTPATIENT)
Dept: LAB | Age: 70
Discharge: HOME | End: 2020-02-05
Payer: MEDICARE

## 2020-02-05 DIAGNOSIS — C50.919 MALIGNANT NEOPLASM OF FEMALE BREAST, UNSPECIFIED ESTROGEN RECEPTOR STATUS, UNSPECIFIED LATERALITY, UNSPECIFIED SITE OF BREAST (HCC): ICD-10-CM

## 2020-02-05 LAB
ALBUMIN SERPL-MCNC: 3.9 G/DL (ref 3.2–4.6)
ALBUMIN/GLOB SERPL: 1 {RATIO} (ref 1.2–3.5)
ALP SERPL-CCNC: 52 U/L (ref 50–136)
ALT SERPL-CCNC: 38 U/L (ref 12–65)
ANION GAP SERPL CALC-SCNC: 4 MMOL/L (ref 7–16)
AST SERPL-CCNC: 26 U/L (ref 15–37)
BASOPHILS # BLD: 0 K/UL (ref 0–0.2)
BASOPHILS NFR BLD: 1 % (ref 0–2)
BILIRUB SERPL-MCNC: 0.5 MG/DL (ref 0.2–1.1)
BUN SERPL-MCNC: 24 MG/DL (ref 8–23)
CALCIUM SERPL-MCNC: 9.9 MG/DL (ref 8.3–10.4)
CHLORIDE SERPL-SCNC: 106 MMOL/L (ref 98–107)
CO2 SERPL-SCNC: 28 MMOL/L (ref 21–32)
CREAT SERPL-MCNC: 1.5 MG/DL (ref 0.6–1)
DIFFERENTIAL METHOD BLD: ABNORMAL
EOSINOPHIL # BLD: 0.7 K/UL (ref 0–0.8)
EOSINOPHIL NFR BLD: 10 % (ref 0.5–7.8)
ERYTHROCYTE [DISTWIDTH] IN BLOOD BY AUTOMATED COUNT: 12.6 % (ref 11.9–14.6)
GLOBULIN SER CALC-MCNC: 3.9 G/DL (ref 2.3–3.5)
GLUCOSE SERPL-MCNC: 129 MG/DL (ref 65–100)
HCT VFR BLD AUTO: 37.6 % (ref 35.8–46.3)
HGB BLD-MCNC: 12.2 G/DL (ref 11.7–15.4)
IMM GRANULOCYTES # BLD AUTO: 0 K/UL (ref 0–0.5)
IMM GRANULOCYTES NFR BLD AUTO: 0 % (ref 0–5)
LYMPHOCYTES # BLD: 1.7 K/UL (ref 0.5–4.6)
LYMPHOCYTES NFR BLD: 26 % (ref 13–44)
MCH RBC QN AUTO: 31.4 PG (ref 26.1–32.9)
MCHC RBC AUTO-ENTMCNC: 32.4 G/DL (ref 31.4–35)
MCV RBC AUTO: 96.7 FL (ref 79.6–97.8)
MONOCYTES # BLD: 0.6 K/UL (ref 0.1–1.3)
MONOCYTES NFR BLD: 10 % (ref 4–12)
NEUTS SEG # BLD: 3.3 K/UL (ref 1.7–8.2)
NEUTS SEG NFR BLD: 53 % (ref 43–78)
NRBC # BLD: 0 K/UL (ref 0–0.2)
PLATELET # BLD AUTO: 188 K/UL (ref 150–450)
PMV BLD AUTO: 9.8 FL (ref 9.4–12.3)
POTASSIUM SERPL-SCNC: 4.4 MMOL/L (ref 3.5–5.1)
PROT SERPL-MCNC: 7.8 G/DL (ref 6.3–8.2)
RBC # BLD AUTO: 3.89 M/UL (ref 4.05–5.25)
SODIUM SERPL-SCNC: 138 MMOL/L (ref 136–145)
WBC # BLD AUTO: 6.3 K/UL (ref 4.3–11.1)

## 2020-02-05 PROCEDURE — 80053 COMPREHEN METABOLIC PANEL: CPT

## 2020-02-05 PROCEDURE — 85025 COMPLETE CBC W/AUTO DIFF WBC: CPT

## 2020-02-05 PROCEDURE — 36415 COLL VENOUS BLD VENIPUNCTURE: CPT

## 2020-08-05 ENCOUNTER — HOSPITAL ENCOUNTER (OUTPATIENT)
Dept: MAMMOGRAPHY | Age: 70
Discharge: HOME | End: 2020-08-05
Attending: INTERNAL MEDICINE
Payer: MEDICARE

## 2020-08-05 DIAGNOSIS — C50.919 MALIGNANT NEOPLASM OF FEMALE BREAST, UNSPECIFIED ESTROGEN RECEPTOR STATUS, UNSPECIFIED LATERALITY, UNSPECIFIED SITE OF BREAST (HCC): ICD-10-CM

## 2020-08-05 DIAGNOSIS — Z12.31 ENCOUNTER FOR SCREENING MAMMOGRAM FOR BREAST CANCER: ICD-10-CM

## 2020-08-05 PROCEDURE — 77067 SCR MAMMO BI INCL CAD: CPT

## 2020-08-21 ENCOUNTER — HOSPITAL ENCOUNTER (OUTPATIENT)
Dept: LAB | Age: 70
Discharge: HOME | End: 2020-08-21
Payer: MEDICARE

## 2020-08-21 ENCOUNTER — HOSPITAL ENCOUNTER (OUTPATIENT)
Dept: INFUSION THERAPY | Age: 70
Discharge: HOME | End: 2020-08-21
Payer: MEDICARE

## 2020-08-21 DIAGNOSIS — M81.0 OSTEOPOROSIS, UNSPECIFIED OSTEOPOROSIS TYPE, UNSPECIFIED PATHOLOGICAL FRACTURE PRESENCE: Primary | ICD-10-CM

## 2020-08-21 DIAGNOSIS — C50.919 MALIGNANT NEOPLASM OF FEMALE BREAST, UNSPECIFIED ESTROGEN RECEPTOR STATUS, UNSPECIFIED LATERALITY, UNSPECIFIED SITE OF BREAST (HCC): ICD-10-CM

## 2020-08-21 LAB
ALBUMIN SERPL-MCNC: 3.8 G/DL (ref 3.2–4.6)
ALBUMIN/GLOB SERPL: 1 {RATIO} (ref 1.2–3.5)
ALP SERPL-CCNC: 48 U/L (ref 50–136)
ALT SERPL-CCNC: 26 U/L (ref 12–65)
ANION GAP SERPL CALC-SCNC: 3 MMOL/L (ref 7–16)
AST SERPL-CCNC: 20 U/L (ref 15–37)
BASOPHILS # BLD: 0 K/UL (ref 0–0.2)
BASOPHILS NFR BLD: 1 % (ref 0–2)
BILIRUB SERPL-MCNC: 0.6 MG/DL (ref 0.2–1.1)
BUN SERPL-MCNC: 27 MG/DL (ref 8–23)
CALCIUM SERPL-MCNC: 9.4 MG/DL (ref 8.3–10.4)
CHLORIDE SERPL-SCNC: 107 MMOL/L (ref 98–107)
CO2 SERPL-SCNC: 27 MMOL/L (ref 21–32)
CREAT SERPL-MCNC: 1.3 MG/DL (ref 0.6–1)
DIFFERENTIAL METHOD BLD: ABNORMAL
EOSINOPHIL # BLD: 0.5 K/UL (ref 0–0.8)
EOSINOPHIL NFR BLD: 9 % (ref 0.5–7.8)
ERYTHROCYTE [DISTWIDTH] IN BLOOD BY AUTOMATED COUNT: 11.8 % (ref 11.9–14.6)
GLOBULIN SER CALC-MCNC: 3.7 G/DL (ref 2.3–3.5)
GLUCOSE SERPL-MCNC: 104 MG/DL (ref 65–100)
HCT VFR BLD AUTO: 35.3 % (ref 35.8–46.3)
HGB BLD-MCNC: 11.7 G/DL (ref 11.7–15.4)
IMM GRANULOCYTES # BLD AUTO: 0 K/UL (ref 0–0.5)
IMM GRANULOCYTES NFR BLD AUTO: 0 % (ref 0–5)
LYMPHOCYTES # BLD: 1.6 K/UL (ref 0.5–4.6)
LYMPHOCYTES NFR BLD: 28 % (ref 13–44)
MCH RBC QN AUTO: 32.2 PG (ref 26.1–32.9)
MCHC RBC AUTO-ENTMCNC: 33.1 G/DL (ref 31.4–35)
MCV RBC AUTO: 97.2 FL (ref 79.6–97.8)
MONOCYTES # BLD: 0.5 K/UL (ref 0.1–1.3)
MONOCYTES NFR BLD: 9 % (ref 4–12)
NEUTS SEG # BLD: 3.1 K/UL (ref 1.7–8.2)
NEUTS SEG NFR BLD: 54 % (ref 43–78)
NRBC # BLD: 0 K/UL (ref 0–0.2)
PLATELET # BLD AUTO: 194 K/UL (ref 150–450)
PMV BLD AUTO: 9.9 FL (ref 9.4–12.3)
POTASSIUM SERPL-SCNC: 4 MMOL/L (ref 3.5–5.1)
PROT SERPL-MCNC: 7.5 G/DL (ref 6.3–8.2)
RBC # BLD AUTO: 3.63 M/UL (ref 4.05–5.25)
SODIUM SERPL-SCNC: 137 MMOL/L (ref 136–145)
WBC # BLD AUTO: 5.8 K/UL (ref 4.3–11.1)

## 2020-08-21 PROCEDURE — 96365 THER/PROPH/DIAG IV INF INIT: CPT

## 2020-08-21 PROCEDURE — 80053 COMPREHEN METABOLIC PANEL: CPT

## 2020-08-21 PROCEDURE — 74011250636 HC RX REV CODE- 250/636: Performed by: INTERNAL MEDICINE

## 2020-08-21 PROCEDURE — 36415 COLL VENOUS BLD VENIPUNCTURE: CPT

## 2020-08-21 PROCEDURE — 85025 COMPLETE CBC W/AUTO DIFF WBC: CPT

## 2020-08-21 RX ORDER — SODIUM CHLORIDE 0.9 % (FLUSH) 0.9 %
10 SYRINGE (ML) INJECTION AS NEEDED
Status: DISCONTINUED | OUTPATIENT
Start: 2020-08-21 | End: 2020-08-23 | Stop reason: HOSPADM

## 2020-08-21 RX ORDER — ZOLEDRONIC ACID 5 MG/100ML
5 INJECTION, SOLUTION INTRAVENOUS ONCE
Status: COMPLETED | OUTPATIENT
Start: 2020-08-21 | End: 2020-08-21

## 2020-08-21 RX ADMIN — Medication 10 ML: at 11:45

## 2020-08-21 RX ADMIN — Medication 10 ML: at 12:05

## 2020-08-21 RX ADMIN — ZOLEDRONIC ACID 5 MG: 5 INJECTION, SOLUTION INTRAVENOUS at 11:45

## 2020-12-28 ENCOUNTER — HOSPITAL ENCOUNTER (OUTPATIENT)
Dept: LAB | Age: 70
Discharge: HOME | End: 2020-12-28
Payer: MEDICARE

## 2020-12-28 LAB
BASOPHILS # BLD: 0 K/UL (ref 0–0.2)
BASOPHILS NFR BLD: 0 % (ref 0–2)
DIFFERENTIAL METHOD BLD: ABNORMAL
EOSINOPHIL # BLD: 0.4 K/UL (ref 0–0.8)
EOSINOPHIL NFR BLD: 4 % (ref 0.5–7.8)
ERYTHROCYTE [DISTWIDTH] IN BLOOD BY AUTOMATED COUNT: 12.5 % (ref 11.9–14.6)
HCT VFR BLD AUTO: 34.2 % (ref 35.8–46.3)
HGB BLD-MCNC: 11.1 G/DL (ref 11.7–15.4)
IMM GRANULOCYTES # BLD AUTO: 0 K/UL (ref 0–0.5)
IMM GRANULOCYTES NFR BLD AUTO: 0 % (ref 0–5)
LYMPHOCYTES # BLD: 2.2 K/UL (ref 0.5–4.6)
LYMPHOCYTES NFR BLD: 25 % (ref 13–44)
MCH RBC QN AUTO: 32 PG (ref 26.1–32.9)
MCHC RBC AUTO-ENTMCNC: 32.5 G/DL (ref 31.4–35)
MCV RBC AUTO: 98.6 FL (ref 79.6–97.8)
MONOCYTES # BLD: 0.8 K/UL (ref 0.1–1.3)
MONOCYTES NFR BLD: 9 % (ref 4–12)
NEUTS SEG # BLD: 5.4 K/UL (ref 1.7–8.2)
NEUTS SEG NFR BLD: 61 % (ref 43–78)
NRBC # BLD: 0 K/UL (ref 0–0.2)
PLATELET # BLD AUTO: 197 K/UL (ref 150–450)
PMV BLD AUTO: 10.5 FL (ref 9.4–12.3)
RBC # BLD AUTO: 3.47 M/UL (ref 4.05–5.2)
WBC # BLD AUTO: 8.8 K/UL (ref 4.3–11.1)

## 2020-12-28 PROCEDURE — 85025 COMPLETE CBC W/AUTO DIFF WBC: CPT

## 2020-12-28 PROCEDURE — 36415 COLL VENOUS BLD VENIPUNCTURE: CPT

## 2020-12-28 PROCEDURE — 83520 IMMUNOASSAY QUANT NOS NONAB: CPT

## 2020-12-29 LAB — TRYPTASE SERPL-MCNC: 9.8 UG/L (ref 2.2–13.2)

## 2021-08-06 ENCOUNTER — HOSPITAL ENCOUNTER (OUTPATIENT)
Dept: MAMMOGRAPHY | Age: 71
Discharge: HOME | End: 2021-08-06
Attending: INTERNAL MEDICINE
Payer: MEDICARE

## 2021-08-06 DIAGNOSIS — Z12.31 ENCOUNTER FOR SCREENING MAMMOGRAM FOR BREAST CANCER: ICD-10-CM

## 2021-08-06 PROCEDURE — 77067 SCR MAMMO BI INCL CAD: CPT

## 2021-08-23 ENCOUNTER — HOSPITAL ENCOUNTER (OUTPATIENT)
Dept: LAB | Age: 71
Discharge: HOME | End: 2021-08-23
Payer: MEDICARE

## 2021-08-23 ENCOUNTER — HOSPITAL ENCOUNTER (OUTPATIENT)
Dept: INFUSION THERAPY | Age: 71
Discharge: HOME | End: 2021-08-23
Payer: MEDICARE

## 2021-08-23 DIAGNOSIS — C50.919 MALIGNANT NEOPLASM OF FEMALE BREAST, UNSPECIFIED ESTROGEN RECEPTOR STATUS, UNSPECIFIED LATERALITY, UNSPECIFIED SITE OF BREAST (HCC): ICD-10-CM

## 2021-08-23 DIAGNOSIS — M81.0 OSTEOPOROSIS, UNSPECIFIED OSTEOPOROSIS TYPE, UNSPECIFIED PATHOLOGICAL FRACTURE PRESENCE: Primary | ICD-10-CM

## 2021-08-23 LAB
ALBUMIN SERPL-MCNC: 3.8 G/DL (ref 3.2–4.6)
ALBUMIN/GLOB SERPL: 1 {RATIO} (ref 1.2–3.5)
ALP SERPL-CCNC: 47 U/L (ref 50–136)
ALT SERPL-CCNC: 25 U/L (ref 12–65)
ANION GAP SERPL CALC-SCNC: 5 MMOL/L (ref 7–16)
AST SERPL-CCNC: 21 U/L (ref 15–37)
BASOPHILS # BLD: 0.1 K/UL (ref 0–0.2)
BASOPHILS NFR BLD: 1 % (ref 0–2)
BILIRUB SERPL-MCNC: 0.7 MG/DL (ref 0.2–1.1)
BUN SERPL-MCNC: 25 MG/DL (ref 8–23)
CALCIUM SERPL-MCNC: 9.3 MG/DL (ref 8.3–10.4)
CHLORIDE SERPL-SCNC: 108 MMOL/L (ref 98–107)
CO2 SERPL-SCNC: 26 MMOL/L (ref 21–32)
CREAT SERPL-MCNC: 1.5 MG/DL (ref 0.6–1)
DIFFERENTIAL METHOD BLD: ABNORMAL
EOSINOPHIL # BLD: 0.8 K/UL (ref 0–0.8)
EOSINOPHIL NFR BLD: 10 % (ref 0.5–7.8)
ERYTHROCYTE [DISTWIDTH] IN BLOOD BY AUTOMATED COUNT: 12.3 % (ref 11.9–14.6)
GLOBULIN SER CALC-MCNC: 3.7 G/DL (ref 2.3–3.5)
GLUCOSE SERPL-MCNC: 135 MG/DL (ref 65–100)
HCT VFR BLD AUTO: 35.5 % (ref 35.8–46.3)
HGB BLD-MCNC: 11.6 G/DL (ref 11.7–15.4)
IMM GRANULOCYTES # BLD AUTO: 0 K/UL (ref 0–0.5)
IMM GRANULOCYTES NFR BLD AUTO: 0 % (ref 0–5)
LYMPHOCYTES # BLD: 1.8 K/UL (ref 0.5–4.6)
LYMPHOCYTES NFR BLD: 25 % (ref 13–44)
MCH RBC QN AUTO: 31.7 PG (ref 26.1–32.9)
MCHC RBC AUTO-ENTMCNC: 32.7 G/DL (ref 31.4–35)
MCV RBC AUTO: 97 FL (ref 79.6–97.8)
MONOCYTES # BLD: 0.5 K/UL (ref 0.1–1.3)
MONOCYTES NFR BLD: 6 % (ref 4–12)
NEUTS SEG # BLD: 4.4 K/UL (ref 1.7–8.2)
NEUTS SEG NFR BLD: 58 % (ref 43–78)
NRBC # BLD: 0 K/UL (ref 0–0.2)
PLATELET # BLD AUTO: 181 K/UL (ref 150–450)
PMV BLD AUTO: 10.2 FL (ref 9.4–12.3)
POTASSIUM SERPL-SCNC: 4.6 MMOL/L (ref 3.5–5.1)
PROT SERPL-MCNC: 7.5 G/DL (ref 6.3–8.2)
RBC # BLD AUTO: 3.66 M/UL (ref 4.05–5.2)
SODIUM SERPL-SCNC: 139 MMOL/L (ref 136–145)
WBC # BLD AUTO: 7.5 K/UL (ref 4.3–11.1)

## 2021-08-23 PROCEDURE — 74011250636 HC RX REV CODE- 250/636: Performed by: INTERNAL MEDICINE

## 2021-08-23 PROCEDURE — 36415 COLL VENOUS BLD VENIPUNCTURE: CPT

## 2021-08-23 PROCEDURE — 96374 THER/PROPH/DIAG INJ IV PUSH: CPT

## 2021-08-23 PROCEDURE — 80053 COMPREHEN METABOLIC PANEL: CPT

## 2021-08-23 PROCEDURE — 85025 COMPLETE CBC W/AUTO DIFF WBC: CPT

## 2021-08-23 RX ORDER — ZOLEDRONIC ACID 5 MG/100ML
5 INJECTION, SOLUTION INTRAVENOUS ONCE
Status: COMPLETED | OUTPATIENT
Start: 2021-08-23 | End: 2021-08-23

## 2021-08-23 RX ADMIN — ZOLEDRONIC ACID 5 MG: 0.05 INJECTION, SOLUTION INTRAVENOUS at 12:20

## 2021-08-23 NOTE — PROGRESS NOTES
Arrived to the [facility]. Reclast completed and tolerated well. Any issues or concerns during appointment: none  Patient given education on reclast  Informed of next infusion appointment scheduled for 8/24/22 at 130pm  Instructed patient to notify provider for any issues or worrisome symptoms. They verbalized understanding. Discharged ambulatory with self.

## 2021-09-20 ENCOUNTER — HOSPITAL ENCOUNTER (OUTPATIENT)
Dept: MAMMOGRAPHY | Age: 71
Discharge: HOME | End: 2021-09-20
Attending: INTERNAL MEDICINE
Payer: MEDICARE

## 2021-09-20 DIAGNOSIS — M81.0 OSTEOPOROSIS, UNSPECIFIED OSTEOPOROSIS TYPE, UNSPECIFIED PATHOLOGICAL FRACTURE PRESENCE: ICD-10-CM

## 2021-09-20 PROCEDURE — 77080 DXA BONE DENSITY AXIAL: CPT

## 2022-03-19 PROBLEM — M81.0 OSTEOPOROSIS: Status: ACTIVE | Noted: 2019-08-12

## 2022-03-19 PROBLEM — N95.2 ATROPHIC VAGINITIS: Status: ACTIVE | Noted: 2018-01-26

## 2022-05-10 DIAGNOSIS — Z17.0 MALIGNANT NEOPLASM OF LOWER-INNER QUADRANT OF RIGHT BREAST OF FEMALE, ESTROGEN RECEPTOR POSITIVE (HCC): Primary | ICD-10-CM

## 2022-05-10 DIAGNOSIS — C50.311 MALIGNANT NEOPLASM OF LOWER-INNER QUADRANT OF RIGHT BREAST OF FEMALE, ESTROGEN RECEPTOR POSITIVE (HCC): Primary | ICD-10-CM

## 2022-05-10 DIAGNOSIS — Z12.31 SCREENING MAMMOGRAM, ENCOUNTER FOR: ICD-10-CM

## 2022-06-24 ASSESSMENT — ENCOUNTER SYMPTOMS
NAUSEA: 0
BACK PAIN: 0

## 2022-06-24 NOTE — PROGRESS NOTES
[facility]  [address]  [address] [facility], [address] [facility]  [phone]    Mounika Cote  : 1950    Chief Complaint   Patient presents with    Follow-up        HPI     Mounika Cote is a 66 y.o. female  Patient had cystocele repair with Perigee system on 2006. Had DVT postop.      Had a small area of exposed mesh in ., started on Estrace cream.    Started on Estrace vaginal cream. No exposed mesh when seen in 3-10. Cystoscopy showed a grade 2 cystocele. This appeared to be distal to the previous sling. Negative MMK test.    She continued to have incontinence as well as postvoid dribbling.    On 12 she had urethrolysis, Anterior Elevate repair . Was using Estrace and Enablex. Had been on Trimethoprim suppression but got C.diff. enterocolitis. States that she got a rash with Macrodantin once, but has taken it several times We tried Macrodantin daily in  but she got a rash again . Can take Cipro without problem.    Takes Enablex 15mg daily. Was on Estrace cream 3x/week, but stopped it in .     Was seen in , pelvic showed possible small area of exposed mesh.    Had chemotherapy for breast cancer. Also XRT.       Has had recurrent UTI.       Has occasional spotting. She is not bothered by this.    Spotting may be from exposed vaginal mesh. Cant give estrogen cream because of recent breast cancer. She does not have any sxs , will follow for now.    Continues Enablex. Had reaction to Trimethoprim. Takes Hiprex.    Has had bloody vaginal spotting in  on 2 occasions . Exam in  showed atrophic vaginal mucosa , no exposed mesh.    Having pain when voiding , left flank pain, occasional vaginal spotting. had pyuria by cath urine but negative culture. Still having pain with voiding and hematuria. cysto in 10-18 showed diffuse cystitis.       On 18 had cysto in OR:  Cystoscopy, hydrodistention of bladder, bladder biopsy, excision of vaginal mesh measuring 1 cm in length     FINDINGS:  Bladder shows diffuse areas of cystitis and after hydrodistention, there are ulcerated areas in the mucosa consistent with Hunner's ulcers.  Also, an area of exposed vaginal mesh on the left distal anterior vaginal wall, which was excised completely. Pathology:    DIAGNOSIS   BLADDER BIOPSIES: CHRONIC CYSTITIS WITH INCREASED EOSINOPHILS.   She states that the sxs are better, pain is better. Back on Xarelto.   Sxs likely due to IC. Discussed options of Elmiron, bladder instillations. She will consult the [facility] website concerning diet. She is intolerant of Amitriptyline.   Has completed round of bladder instillations. She feels that she is doing better. IC prudent diet is improving her sxs . Started on bladder instillations.     Oncology does not want her to have any vaginal estrogen cream.   Has been using Replens vaginal moisturizing cream. Having some vaginal discharge, but no bleeding . She has been taken off of Aromasin.   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 Her sxs are better . low acid diet is helping. Likes Prelief. Continues Enablex, hiprex, Prelief prn. continues Xarelto. Came in with c/o blood on tissue after voiding and dysuria.   Exam 11-20: no bleeding noted. Vagina stenotic, no palpable mesh or bleeding noted, nontender . She went to an allergist. Is not allergic to PCN, sulfa, macrodantin or latex. She has occasional spotting and some dysuria.     she was in an online relationship.,she got swindled out of $250,000 in 2021. There is some concern by her PCP that the may have some issues with her cognition. she has seen [facility] neurology and had brain MRI, this showed evidence of old stroke. MRI 12-15-21: 1.  No acute intracranial abnormality.      2. Global parenchymal volume loss with mild bilateral hippocampal atrophy.  No disproportionate midbrain volume loss or asymmetric lobar atrophy.      3. Remote lacunar infarction in the left caudate nucleus and likely right cerebellum a mild burden of chronic small vessel disease, which is not advanced for the patient's age. She is still on Hiprex .has occasional spotting. Today she c/o more urinary frequency, more so at night, and right pelvic pain. Past Medical History:   Diagnosis Date    Acute cystitis     Acute venous embolism and thrombosis of deep vessels of distal lower extremity (HCC)     Allergic rhinitis     takes allegra daily    Anemia 10/5/2015    Arthritis     knees    Asthma     \"intrinsic asthma\" allergic to certain odors (no inhalers); no meds; denies SOB w 1 flight of steps    Bladder neck obstruction     Breast cancer ([address] dx 7/14    right breast lumpectomy    Cancer ([address]     Cardiomyopathy ([address]     Chronic pain     with urination/ neck    Coagulation disorder ([facility]     anemia    Cystocele, midline     Depression 10/5/2015    Dyspnea 10/5/2015    Dysuria     Female stress incontinence     Female stress incontinence     Heart failure (HCC)     History of anemia 2013    History of Clostridium difficile infection 2006    History of gestational diabetes     History of vertigo     last episode 10/14/14; takes meclizine prn    Hyperactivity of bladder     takes med    Hypertension     controlled by medication    IC (interstitial cystitis)     Ill-defined condition     Incomplete bladder emptying     Increased urinary frequency     takes med    Other chronic cystitis     Post-operative nausea and vomiting     many yrs ago after surgery    Postmenopausal atrophic vaginitis     Psychiatric disorder     cymbalta for stress & headaches    Seizures ([address]     \"convusions at age 1\"  ? ?cause ? ?febrile seizure    Stress incontinence 10/5/2015    Thromboembolus ([facility] 1997, 2002    left leg \"DVT treated with elevation\"  \"no meds\"    Thromboembolus ([address] 2006    right leg s/p cystocele (tx coumadin x 6 months    Urge incontinence     Urinary tract infection, site not specified     UTI (urinary tract infection)      Past Surgical History:   Procedure Laterality Date    BREAST BIOPSY Right 7/14    BREAST BIOPSY Left 1994    surgical    BREAST LUMPECTOMY  8/15/2014    RIGHT BREAST NEEDLE LOCALIZED LUMPECTOMY    PREOP @ 10:00 / NUC MED 11:45 / NEEDLE LOC 2:00 / SURGERY @ 4:30  performed by Geraldine Willoughby MD at [address] Left 1994    breast benign lumpectomy    HERNIA REPAIR Right 2002    ventral    HYSTERECTOMY  1973    vaginal    OTHER SURGICAL HISTORY      PORT PLACEMENT    UROLOGICAL SURGERY  2006    cystocele repair    UROLOGICAL SURGERY  2000    cystocele repair    UROLOGICAL SURGERY  2005    cystocele and rectocele repair    UROLOGICAL SURGERY      vaginal sling urethropexy    UROLOGICAL SURGERY  2001    adhesions and nerve pain due to cystocele    VASCULAR SURGERY       Current Outpatient Medications   Medication Sig Dispense Refill    ciprofloxacin (CIPRO) 250 MG tablet Take 1 tablet by mouth 2 times daily for 5 days 10 tablet 0    Biotin 2.5 MG TABS Take 5,000 mcg by mouth daily      vitamin D 25 MCG (1000 UT) CAPS Take 5,000 Units by mouth 2 times daily      colesevelam (WELCHOL) 625 MG tablet Take 1,875 mg by mouth 2 times daily (with meals)      DULoxetine (CYMBALTA) 60 MG extended release capsule Take 60 mg by mouth      fexofenadine (ALLEGRA) 180 MG tablet Take by mouth      metFORMIN (GLUCOPHAGE-XR) 500 MG extended release tablet Take by mouth Daily with supper      methenamine (HIPREX) 1 g tablet Take 1 g by mouth 2 times daily (with meals)      metoprolol succinate (TOPROL XL) 25 MG extended release tablet TAKE 1 TABLET DAILY      rivaroxaban (XARELTO) 10 MG TABS tablet TAKE 1 TABLET DAILY WITH BREAKFAST       No current facility-administered medications for this visit.      Allergies   Allergen Reactions    Amitriptyline Other (See Comments)     Increased depression    Atorvastatin Hives    Clindamycin Other (See Comments)     C-Diff    Diphenhydramine Other (See Comments)     \"spacy feeling & memory loss for 10 days after taking oral benadryl\". Pt states benadryl cream is ok to use.  Sulfa Antibiotics Other (See Comments)     Bleeding     Sulfanilamide Other (See Comments)     Vaginal bleeding    Trimethoprim Nausea Only    Cephalexin Rash    Doxycycline Rash    Ibuprofen Anxiety    Nitrofurantoin Rash    Penicillins Rash     Social History     Socioeconomic History    Marital status:      Spouse name: Not on file    Number of children: Not on file    Years of education: Not on file    Highest education level: Not on file   Occupational History    Not on file   Tobacco Use    Smoking status: Never Smoker    Smokeless tobacco: Never Used   Substance and Sexual Activity    Alcohol use: Yes     Alcohol/week: 0.8 standard drinks    Drug use: No     Types: Prescription, OTC    Sexual activity: Not on file   Other Topics Concern    Not on file   Social History Narrative    Not on file     Social Determinants of Health     Financial Resource Strain:     Difficulty of Paying Living Expenses: Not on file   Food Insecurity:     Worried About Running Out of Food in the Last Year: Not on file    Miguel A of Food in the Last Year: Not on file   Transportation Needs:     Lack of Transportation (Medical): Not on file    Lack of Transportation (Non-Medical):  Not on file   Physical Activity:     Days of Exercise per Week: Not on file    Minutes of Exercise per Session: Not on file   Stress:     Feeling of Stress : Not on file   Social Connections:     Frequency of Communication with Friends and Family: Not on file    Frequency of Social Gatherings with Friends and Family: Not on file    Attends Caodaism Services: Not on file    Active Member of Clubs or Organizations: Not on file    Attends Club or Organization Meetings: Not on file    Marital Status: Not on file Intimate Partner Violence:     Fear of Current or Ex-Partner: Not on file    Emotionally Abused: Not on file    Physically Abused: Not on file    Sexually Abused: Not on file   Housing Stability:     Unable to Pay for Housing in the Last Year: Not on file    Number of Places Lived in the Last Year: Not on file    Unstable Housing in the Last Year: Not on file     Family History   Problem Relation Age of Onset    Cancer Sister     Coronary Art Dis Father     Hypertension Father     Heart Disease Father     Heart Disease Other         gen fam hx    Heart Disease Sister     Heart Disease Mother     Hypertension Other         gen fam hx    Diabetes Other         gen fam hx    Breast Cancer Sister 58       Review of Systems  Constitutional:   Negative for fever. Cardiovascular:  Negative for chest pain. GI:  Negative for nausea. Genitourinary:  Negative for urinary burning. Musculoskeletal:  Negative for back pain. There were no vitals taken for this visit. Urinalysis  UA - Dipstick  Results for orders placed or performed in visit on 06/27/22   AMB POC URINALYSIS DIP STICK AUTO W/O MICRO   Result Value Ref Range    Color (UA POC)      Clarity (UA POC)      Glucose, Urine, POC Negative Negative    Bilirubin, Urine, POC Negative Negative    KETONES, Urine, POC Negative Negative    Specific Gravity, Urine, POC 1.025 1.001 - 1.035    Blood (UA POC) Moderate Negative    pH, Urine, POC 5.5 4.6 - 8.0    Protein, Urine, POC 30  Negative    Urobilinogen, POC Normal     Nitrite, Urine, POC Positive Negative    Leukocyte Esterase, Urine, POC Large Negative       UA - Micro  WBC - 30-40  RBC - 0  Bacteria - 1+  Epith - 0    Physical Exam    Assessment and Plan    ICD-10-CM    1. Acute cystitis without hematuria  N30.00 AMB POC URINALYSIS DIP STICK AUTO W/O MICRO     Culture, Urine     Culture, Urine     ciprofloxacin (CIPRO) 250 MG tablet     CANCELED: Culture, Urine   2.  Atrophic vaginitis  N95.2 3. History of DVT of lower extremity  Z86.718        Orders Placed This Encounter   Procedures    Culture, Urine     Standing Status:   Future     Number of Occurrences:   1     Standing Expiration Date:   6/27/2023     Order Specific Question:   Specify (ex-cath, midstream, cysto, etc)? Answer:   midstream    AMB POC URINALYSIS DIP STICK AUTO W/O MICRO     Will treat for UTI. Follow-up and Dispositions    · Return in about 4 months (around 10/27/2022).

## 2022-06-27 ENCOUNTER — OFFICE VISIT (OUTPATIENT)
Dept: UROLOGY | Age: 72
End: 2022-06-27
Payer: MEDICARE

## 2022-06-27 DIAGNOSIS — Z86.718 HISTORY OF DVT OF LOWER EXTREMITY: ICD-10-CM

## 2022-06-27 DIAGNOSIS — N95.2 ATROPHIC VAGINITIS: ICD-10-CM

## 2022-06-27 DIAGNOSIS — N30.00 ACUTE CYSTITIS WITHOUT HEMATURIA: Primary | ICD-10-CM

## 2022-06-27 LAB
BILIRUBIN, URINE, POC: NEGATIVE
BLOOD URINE, POC: NORMAL
GLUCOSE URINE, POC: NEGATIVE
KETONES, URINE, POC: NEGATIVE
LEUKOCYTE ESTERASE, URINE, POC: NORMAL
NITRITE, URINE, POC: POSITIVE
PH, URINE, POC: 5.5 (ref 4.6–8)
PROTEIN,URINE, POC: 30
SPECIFIC GRAVITY, URINE, POC: 1.02 (ref 1–1.03)
URINALYSIS CLARITY, POC: NORMAL
URINALYSIS COLOR, POC: NORMAL
UROBILINOGEN, POC: NORMAL

## 2022-06-27 PROCEDURE — 3017F COLORECTAL CA SCREEN DOC REV: CPT | Performed by: UROLOGY

## 2022-06-27 PROCEDURE — 99213 OFFICE O/P EST LOW 20 MIN: CPT | Performed by: UROLOGY

## 2022-06-27 PROCEDURE — 1123F ACP DISCUSS/DSCN MKR DOCD: CPT | Performed by: UROLOGY

## 2022-06-27 PROCEDURE — G8417 CALC BMI ABV UP PARAM F/U: HCPCS | Performed by: UROLOGY

## 2022-06-27 PROCEDURE — G8399 PT W/DXA RESULTS DOCUMENT: HCPCS | Performed by: UROLOGY

## 2022-06-27 PROCEDURE — 4004F PT TOBACCO SCREEN RCVD TLK: CPT | Performed by: UROLOGY

## 2022-06-27 PROCEDURE — G8427 DOCREV CUR MEDS BY ELIG CLIN: HCPCS | Performed by: UROLOGY

## 2022-06-27 PROCEDURE — 1090F PRES/ABSN URINE INCON ASSESS: CPT | Performed by: UROLOGY

## 2022-06-27 PROCEDURE — 81003 URINALYSIS AUTO W/O SCOPE: CPT | Performed by: UROLOGY

## 2022-06-27 RX ORDER — CIPROFLOXACIN 250 MG/1
250 TABLET, FILM COATED ORAL 2 TIMES DAILY
Qty: 10 TABLET | Refills: 0 | Status: SHIPPED | OUTPATIENT
Start: 2022-06-27 | End: 2022-07-02

## 2022-06-30 LAB
BACTERIA SPEC CULT: ABNORMAL
BACTERIA SPEC CULT: ABNORMAL
SERVICE CMNT-IMP: ABNORMAL

## 2022-08-09 ENCOUNTER — HOSPITAL ENCOUNTER (OUTPATIENT)
Dept: MAMMOGRAPHY | Age: 72
Discharge: HOME | End: 2022-08-12
Payer: MEDICARE

## 2022-08-09 DIAGNOSIS — Z12.31 VISIT FOR SCREENING MAMMOGRAM: ICD-10-CM

## 2022-08-09 PROCEDURE — 77067 SCR MAMMO BI INCL CAD: CPT

## 2022-08-15 ENCOUNTER — TELEPHONE (OUTPATIENT)
Dept: UROLOGY | Age: 72
End: 2022-08-15

## 2022-08-15 NOTE — TELEPHONE ENCOUNTER
Pt came in office today requesting an appointment with Dr. Nathalie Berrios for medical advise. I gave the option to see NP but patient would like to see the doctor for a soon appointment.  If you can please get back with patient at [phone]

## 2022-08-18 NOTE — TELEPHONE ENCOUNTER
Called pt no answer, lvm informing her of her upcoming appt with [facility] in October and if she needed a sooner appt it would have to be with NP.

## 2022-08-19 RX ORDER — HEPARIN SODIUM (PORCINE) LOCK FLUSH IV SOLN 100 UNIT/ML 100 UNIT/ML
500 SOLUTION INTRAVENOUS PRN
OUTPATIENT
Start: 2022-08-24

## 2022-08-19 RX ORDER — ZOLEDRONIC ACID 5 MG/100ML
5 INJECTION, SOLUTION INTRAVENOUS ONCE
Status: CANCELLED | OUTPATIENT
Start: 2022-08-24 | End: 2022-08-24

## 2022-08-19 RX ORDER — EPINEPHRINE 1 MG/ML
0.3 INJECTION, SOLUTION, CONCENTRATE INTRAVENOUS PRN
OUTPATIENT
Start: 2022-08-24

## 2022-08-19 RX ORDER — ACETAMINOPHEN 325 MG/1
650 TABLET ORAL
OUTPATIENT
Start: 2022-08-24

## 2022-08-19 RX ORDER — DIPHENHYDRAMINE HYDROCHLORIDE 50 MG/ML
50 INJECTION INTRAMUSCULAR; INTRAVENOUS
OUTPATIENT
Start: 2022-08-24

## 2022-08-19 RX ORDER — SODIUM CHLORIDE 9 MG/ML
INJECTION, SOLUTION INTRAVENOUS ONCE
Status: CANCELLED | OUTPATIENT
Start: 2022-08-24 | End: 2022-08-24

## 2022-08-19 RX ORDER — ALBUTEROL SULFATE 90 UG/1
4 AEROSOL, METERED RESPIRATORY (INHALATION) PRN
OUTPATIENT
Start: 2022-08-24

## 2022-08-19 RX ORDER — 0.9 % SODIUM CHLORIDE 0.9 %
500 INTRAVENOUS SOLUTION INTRAVENOUS ONCE
Status: CANCELLED | OUTPATIENT
Start: 2022-08-24 | End: 2022-08-24

## 2022-08-19 RX ORDER — ONDANSETRON 2 MG/ML
8 INJECTION INTRAMUSCULAR; INTRAVENOUS
OUTPATIENT
Start: 2022-08-24

## 2022-08-19 RX ORDER — SODIUM CHLORIDE 9 MG/ML
5-250 INJECTION, SOLUTION INTRAVENOUS PRN
OUTPATIENT
Start: 2022-08-24

## 2022-08-19 RX ORDER — FAMOTIDINE 10 MG/ML
20 INJECTION, SOLUTION INTRAVENOUS
OUTPATIENT
Start: 2022-08-24

## 2022-08-19 RX ORDER — SODIUM CHLORIDE 9 MG/ML
INJECTION, SOLUTION INTRAVENOUS CONTINUOUS
OUTPATIENT
Start: 2022-08-24

## 2022-08-19 RX ORDER — SODIUM CHLORIDE 0.9 % (FLUSH) 0.9 %
5-40 SYRINGE (ML) INJECTION PRN
Status: CANCELLED | OUTPATIENT
Start: 2022-08-24

## 2022-08-24 ENCOUNTER — HOSPITAL ENCOUNTER (OUTPATIENT)
Dept: INFUSION THERAPY | Age: 72
Discharge: HOME | End: 2022-08-24
Payer: MEDICARE

## 2022-08-24 ENCOUNTER — HOSPITAL ENCOUNTER (OUTPATIENT)
Dept: LAB | Age: 72
Discharge: HOME | End: 2022-08-27
Payer: MEDICARE

## 2022-08-24 ENCOUNTER — OFFICE VISIT (OUTPATIENT)
Dept: ONCOLOGY | Age: 72
End: 2022-08-24
Payer: MEDICARE

## 2022-08-24 VITALS
BODY MASS INDEX: 27.45 KG/M2 | HEIGHT: 60 IN | DIASTOLIC BLOOD PRESSURE: 69 MMHG | SYSTOLIC BLOOD PRESSURE: 112 MMHG | TEMPERATURE: 97.7 F | RESPIRATION RATE: 16 BRPM | HEART RATE: 87 BPM | OXYGEN SATURATION: 99 % | WEIGHT: 139.8 LBS

## 2022-08-24 DIAGNOSIS — Z17.0 MALIGNANT NEOPLASM OF LOWER-INNER QUADRANT OF RIGHT BREAST OF FEMALE, ESTROGEN RECEPTOR POSITIVE (HCC): ICD-10-CM

## 2022-08-24 DIAGNOSIS — Z85.3 PERSONAL HISTORY OF MALIGNANT NEOPLASM OF BREAST: Primary | ICD-10-CM

## 2022-08-24 DIAGNOSIS — C50.311 MALIGNANT NEOPLASM OF LOWER-INNER QUADRANT OF RIGHT BREAST OF FEMALE, ESTROGEN RECEPTOR POSITIVE (HCC): ICD-10-CM

## 2022-08-24 DIAGNOSIS — M81.0 OSTEOPOROSIS WITHOUT CURRENT PATHOLOGICAL FRACTURE, UNSPECIFIED OSTEOPOROSIS TYPE: Primary | ICD-10-CM

## 2022-08-24 DIAGNOSIS — M81.0 AGE-RELATED OSTEOPOROSIS WITHOUT CURRENT PATHOLOGICAL FRACTURE: ICD-10-CM

## 2022-08-24 LAB
ALBUMIN SERPL-MCNC: 3.7 G/DL (ref 3.2–4.6)
ALBUMIN/GLOB SERPL: 1 {RATIO} (ref 1.2–3.5)
ALP SERPL-CCNC: 49 U/L (ref 50–136)
ALT SERPL-CCNC: 23 U/L (ref 12–65)
ANION GAP SERPL CALC-SCNC: 6 MMOL/L (ref 7–16)
AST SERPL-CCNC: 18 U/L (ref 15–37)
BASOPHILS # BLD: 0 K/UL (ref 0–0.2)
BASOPHILS NFR BLD: 0 % (ref 0–2)
BILIRUB SERPL-MCNC: 0.5 MG/DL (ref 0.2–1.1)
BUN SERPL-MCNC: 34 MG/DL (ref 8–23)
CALCIUM SERPL-MCNC: 8.8 MG/DL (ref 8.3–10.4)
CHLORIDE SERPL-SCNC: 107 MMOL/L (ref 98–107)
CO2 SERPL-SCNC: 24 MMOL/L (ref 21–32)
CREAT SERPL-MCNC: 1.3 MG/DL (ref 0.6–1)
DIFFERENTIAL METHOD BLD: ABNORMAL
EOSINOPHIL # BLD: 0.6 K/UL (ref 0–0.8)
EOSINOPHIL NFR BLD: 8 % (ref 0.5–7.8)
ERYTHROCYTE [DISTWIDTH] IN BLOOD BY AUTOMATED COUNT: 12.7 % (ref 11.9–14.6)
GLOBULIN SER CALC-MCNC: 3.7 G/DL (ref 2.3–3.5)
GLUCOSE SERPL-MCNC: 153 MG/DL (ref 65–100)
HCT VFR BLD AUTO: 36.7 % (ref 35.8–46.3)
HGB BLD-MCNC: 12 G/DL (ref 11.7–15.4)
IMM GRANULOCYTES # BLD AUTO: 0 K/UL (ref 0–0.5)
IMM GRANULOCYTES NFR BLD AUTO: 0 % (ref 0–5)
LYMPHOCYTES # BLD: 2.1 K/UL (ref 0.5–4.6)
LYMPHOCYTES NFR BLD: 29 % (ref 13–44)
MCH RBC QN AUTO: 31.7 PG (ref 26.1–32.9)
MCHC RBC AUTO-ENTMCNC: 32.7 G/DL (ref 31.4–35)
MCV RBC AUTO: 97.1 FL (ref 79.6–97.8)
MONOCYTES # BLD: 0.6 K/UL (ref 0.1–1.3)
MONOCYTES NFR BLD: 9 % (ref 4–12)
NEUTS SEG # BLD: 4 K/UL (ref 1.7–8.2)
NEUTS SEG NFR BLD: 55 % (ref 43–78)
NRBC # BLD: 0 K/UL (ref 0–0.2)
PLATELET # BLD AUTO: 188 K/UL (ref 150–450)
PMV BLD AUTO: 10 FL (ref 9.4–12.3)
POTASSIUM SERPL-SCNC: 4.3 MMOL/L (ref 3.5–5.1)
PROT SERPL-MCNC: 7.4 G/DL (ref 6.3–8.2)
RBC # BLD AUTO: 3.78 M/UL (ref 4.05–5.2)
SODIUM SERPL-SCNC: 137 MMOL/L (ref 136–145)
WBC # BLD AUTO: 7.4 K/UL (ref 4.3–11.1)

## 2022-08-24 PROCEDURE — G8399 PT W/DXA RESULTS DOCUMENT: HCPCS | Performed by: INTERNAL MEDICINE

## 2022-08-24 PROCEDURE — 99214 OFFICE O/P EST MOD 30 MIN: CPT | Performed by: INTERNAL MEDICINE

## 2022-08-24 PROCEDURE — 36415 COLL VENOUS BLD VENIPUNCTURE: CPT

## 2022-08-24 PROCEDURE — G8417 CALC BMI ABV UP PARAM F/U: HCPCS | Performed by: INTERNAL MEDICINE

## 2022-08-24 PROCEDURE — 6360000002 HC RX W HCPCS: Performed by: INTERNAL MEDICINE

## 2022-08-24 PROCEDURE — 1123F ACP DISCUSS/DSCN MKR DOCD: CPT | Performed by: INTERNAL MEDICINE

## 2022-08-24 PROCEDURE — 2580000003 HC RX 258: Performed by: INTERNAL MEDICINE

## 2022-08-24 PROCEDURE — 85025 COMPLETE CBC W/AUTO DIFF WBC: CPT

## 2022-08-24 PROCEDURE — 3017F COLORECTAL CA SCREEN DOC REV: CPT | Performed by: INTERNAL MEDICINE

## 2022-08-24 PROCEDURE — 1090F PRES/ABSN URINE INCON ASSESS: CPT | Performed by: INTERNAL MEDICINE

## 2022-08-24 PROCEDURE — 96374 THER/PROPH/DIAG INJ IV PUSH: CPT

## 2022-08-24 PROCEDURE — G8427 DOCREV CUR MEDS BY ELIG CLIN: HCPCS | Performed by: INTERNAL MEDICINE

## 2022-08-24 PROCEDURE — 80053 COMPREHEN METABOLIC PANEL: CPT

## 2022-08-24 PROCEDURE — 1036F TOBACCO NON-USER: CPT | Performed by: INTERNAL MEDICINE

## 2022-08-24 RX ORDER — ZOLEDRONIC ACID 5 MG/100ML
5 INJECTION, SOLUTION INTRAVENOUS ONCE
Status: CANCELLED | OUTPATIENT
Start: 2022-08-24 | End: 2022-08-24

## 2022-08-24 RX ORDER — SODIUM CHLORIDE 0.9 % (FLUSH) 0.9 %
5-40 SYRINGE (ML) INJECTION PRN
Status: DISCONTINUED | OUTPATIENT
Start: 2022-08-24 | End: 2022-08-25 | Stop reason: HOSPADM

## 2022-08-24 RX ORDER — SODIUM CHLORIDE 9 MG/ML
INJECTION, SOLUTION INTRAVENOUS CONTINUOUS
OUTPATIENT
Start: 2022-08-24

## 2022-08-24 RX ORDER — SODIUM CHLORIDE 0.9 % (FLUSH) 0.9 %
5-40 SYRINGE (ML) INJECTION PRN
OUTPATIENT
Start: 2022-08-24

## 2022-08-24 RX ORDER — SODIUM CHLORIDE 9 MG/ML
5-250 INJECTION, SOLUTION INTRAVENOUS PRN
OUTPATIENT
Start: 2022-08-24

## 2022-08-24 RX ORDER — EPINEPHRINE 1 MG/ML
0.3 INJECTION, SOLUTION, CONCENTRATE INTRAVENOUS PRN
OUTPATIENT
Start: 2022-08-24

## 2022-08-24 RX ORDER — SODIUM CHLORIDE 9 MG/ML
INJECTION, SOLUTION INTRAVENOUS ONCE
Status: DISCONTINUED | OUTPATIENT
Start: 2022-08-24 | End: 2022-08-25 | Stop reason: HOSPADM

## 2022-08-24 RX ORDER — ACETAMINOPHEN 325 MG/1
650 TABLET ORAL
OUTPATIENT
Start: 2022-08-24

## 2022-08-24 RX ORDER — SODIUM CHLORIDE 9 MG/ML
INJECTION, SOLUTION INTRAVENOUS ONCE
Status: CANCELLED | OUTPATIENT
Start: 2022-08-24 | End: 2022-08-24

## 2022-08-24 RX ORDER — 0.9 % SODIUM CHLORIDE 0.9 %
500 INTRAVENOUS SOLUTION INTRAVENOUS ONCE
Status: CANCELLED | OUTPATIENT
Start: 2022-08-24 | End: 2022-08-24

## 2022-08-24 RX ORDER — ONDANSETRON 2 MG/ML
8 INJECTION INTRAMUSCULAR; INTRAVENOUS
OUTPATIENT
Start: 2022-08-24

## 2022-08-24 RX ORDER — DIPHENHYDRAMINE HYDROCHLORIDE 50 MG/ML
50 INJECTION INTRAMUSCULAR; INTRAVENOUS
OUTPATIENT
Start: 2022-08-24

## 2022-08-24 RX ORDER — HEPARIN SODIUM (PORCINE) LOCK FLUSH IV SOLN 100 UNIT/ML 100 UNIT/ML
500 SOLUTION INTRAVENOUS PRN
OUTPATIENT
Start: 2022-08-24

## 2022-08-24 RX ORDER — ALBUTEROL SULFATE 90 UG/1
4 AEROSOL, METERED RESPIRATORY (INHALATION) PRN
OUTPATIENT
Start: 2022-08-24

## 2022-08-24 RX ORDER — 0.9 % SODIUM CHLORIDE 0.9 %
500 INTRAVENOUS SOLUTION INTRAVENOUS ONCE
Status: COMPLETED | OUTPATIENT
Start: 2022-08-24 | End: 2022-08-24

## 2022-08-24 RX ORDER — ZOLEDRONIC ACID 5 MG/100ML
5 INJECTION, SOLUTION INTRAVENOUS ONCE
Status: COMPLETED | OUTPATIENT
Start: 2022-08-24 | End: 2022-08-24

## 2022-08-24 RX ADMIN — ZOLEDRONIC ACID 5 MG: 0.05 INJECTION, SOLUTION INTRAVENOUS at 14:30

## 2022-08-24 RX ADMIN — SODIUM CHLORIDE, PRESERVATIVE FREE 10 ML: 5 INJECTION INTRAVENOUS at 14:25

## 2022-08-24 RX ADMIN — SODIUM CHLORIDE 500 ML: 9 INJECTION, SOLUTION INTRAVENOUS at 14:46

## 2022-08-24 ASSESSMENT — ANXIETY QUESTIONNAIRES
6. BECOMING EASILY ANNOYED OR IRRITABLE: 0
3. WORRYING TOO MUCH ABOUT DIFFERENT THINGS: 0
IF YOU CHECKED OFF ANY PROBLEMS ON THIS QUESTIONNAIRE, HOW DIFFICULT HAVE THESE PROBLEMS MADE IT FOR YOU TO DO YOUR WORK, TAKE CARE OF THINGS AT HOME, OR GET ALONG WITH OTHER PEOPLE: NOT DIFFICULT AT ALL
7. FEELING AFRAID AS IF SOMETHING AWFUL MIGHT HAPPEN: 0
1. FEELING NERVOUS, ANXIOUS, OR ON EDGE: 0
2. NOT BEING ABLE TO STOP OR CONTROL WORRYING: 0
4. TROUBLE RELAXING: 0
5. BEING SO RESTLESS THAT IT IS HARD TO SIT STILL: 0
GAD7 TOTAL SCORE: 0

## 2022-08-24 ASSESSMENT — PATIENT HEALTH QUESTIONNAIRE - PHQ9
7. TROUBLE CONCENTRATING ON THINGS, SUCH AS READING THE NEWSPAPER OR WATCHING TELEVISION: 0
6. FEELING BAD ABOUT YOURSELF - OR THAT YOU ARE A FAILURE OR HAVE LET YOURSELF OR YOUR FAMILY DOWN: 0
SUM OF ALL RESPONSES TO PHQ QUESTIONS 1-9: 2
9. THOUGHTS THAT YOU WOULD BE BETTER OFF DEAD, OR OF HURTING YOURSELF: 0
5. POOR APPETITE OR OVEREATING: 0
1. LITTLE INTEREST OR PLEASURE IN DOING THINGS: 0
2. FEELING DOWN, DEPRESSED OR HOPELESS: 0
3. TROUBLE FALLING OR STAYING ASLEEP: 0
8. MOVING OR SPEAKING SO SLOWLY THAT OTHER PEOPLE COULD HAVE NOTICED. OR THE OPPOSITE, BEING SO FIGETY OR RESTLESS THAT YOU HAVE BEEN MOVING AROUND A LOT MORE THAN USUAL: 0
SUM OF ALL RESPONSES TO PHQ9 QUESTIONS 1 & 2: 0
4. FEELING TIRED OR HAVING LITTLE ENERGY: 2
SUM OF ALL RESPONSES TO PHQ QUESTIONS 1-9: 2

## 2022-08-24 NOTE — PROGRESS NOTES
Arrived to the [facility]. Reclast completed. Patient tolerated well. Any issues or concerns during appointment: none. Patient instructed to call provider with temperature of 100.4 or greater or nausea/vomiting/ diarrhea or pain not controlled by medications  Discharged ambulatory.
Bladder non-tender and non-distended. Urine clear yellow.

## 2022-08-24 NOTE — PROGRESS NOTES
[facility] Hematology & Oncology: Office Visit Progress Note      Chief Complaint:     Breast cancer T1N0M0      History of Present Illness:   Ms. Iveth José is a 66 y.o. female referred by Dr. Markus Schultz for breast cancer management      Ms. Thomas's PMH includes ARTHRITIS, DVT, HTN, and asthma. Ms. Sherry Workman presented for her yearly mammogram and calcifications were seen on screening mammogram. Subsequent imaging was performed as follows:       7/7/14 DIGITAL MAMMOGRAM FINDINGS:    Straight mediolateral view of the right breast as well as magnification images of the right breast in the CC, and ML plane are submitted for evaluation. The clustered microcalcifications are seen at the 5 o'clock position of the right breast right located  3 cm from the nipple. These appear heterogeneous following magnification. No definite branching is seen. No worrisome associated asymmetry is seen. IMPRESSION:    1. Heterogeneous right breast calcifications which are incompletely characterized. Recommend further evaluation with stereotactic guided biopsy. 7/14/14 PATHOLOGY DIAGNOSIS of biopsy:   \"RIGHT BREAST STEREO BIOPSY CALCIFICATIONS AT 5 O'CLOCK, STEREOTACTIC BIOPSY\": IN SITU AND INFILTRATING DUCTAL CARCINOMA. SEE SYNOPTIC REPORT BELOW. ANATOMIC SITE: Right breast 5 o'clock position. PROCEDURE: Stereotactic biopsy    HISTOLOGIC TYPE: In situ and infiltrating ductal carcinoma    SIZE: At least 3 millimeters (fragmented tissue). BENITEZ MODIFICATION OF BLOOM-RODGERS GRADE:    ARCHITECTURAL SCORE: 2/3. NUCLEAR SCORE: 2/3. MITOTIC SCORE: 2/3. TOTAL SCORE: 6/9 = Intermediate Grade (Moderately differentiated). DUCTAL CARCINOMA IN SITU: Present, high grade, with comedonecrosis. LYMPHOVASCULAR INVASION: Not definitively identified. MICROCALCIFICATIONS: Present. TNM CLASSIFICATION: At least pT1a NX (AJCC Seventh Edition 2010).     ESTROGEN RECEPTOR: Positive (99%)    PROGESTERONE RECEPTOR: Negative    HER-2/YVONNE: Equivocal (2+)       7/21/14 BREAST MRI    INDICATION: New diagnosis of breast cancer    FINDINGS:    There are biopsy changes and a small hematoma in the right breast anteriorly at 6:00. There is a 1 cm enhancing mass along the posterior margin of the biopsy site compatible with residual tumor. No other areas of significant abnormal enhancement are seen  in either breast. There is no significant axillary adenopathy. There are no chest wall lesions. IMPRESSION: 1 cm enhancing mass at 6:00 in the right breast compatible with known breast cancer. No additional lesions are seen. 8/14/14 PATHOLOGY DIAGNOSIS     A: \"RIGHT AXILLARY SENTINEL NODE\": 6 LYMPH NODES NEGATIVE FOR METASTATIC CARCINOMA. SEE COMMENT. B: \"MASS RIGHT BREAST\": INFILTRATING DUCT CARCINOMA, LOW GRADE (WELL DIFFERENTIATED) WITH ASSOCIATED DUCTAL CARCINOMA IN SITU, HIGH GRADE (CRIBRIFORM TYPE) MEASURING UP TO 0.8 X 0.7 CM ON SLIDE. LYMPHOVASCULAR INVASION IS NOT IDENTIFIED. SEE C, D, E, F, G AND H FOR NEGATIVE MARGINS. C: \"POSTERIOR MARGIN, RIGHT BREAST MASS\": BREAST NEGATIVE FOR MALIGNANT TUMOR. D: \"INFERIOR MARGIN, RIGHT BREAST MASS\": BREAST NEGATIVE FOR MALIGNANT TUMOR. E: \"MEDIAL MARGIN, RIGHT BREAST MASS\": BREAST NEGATIVE FOR MALIGNANT TUMOR. F: \"SUPERIOR MARGIN, RIGHT BREAST MASS\": BREAST NEGATIVE FOR MALIGNANT TUMOR. G: \"LATERAL MARGIN, RIGHT BREAST MASS\": BREAST NEGATIVE FOR MALIGNANT TUMOR. H: \"ANTERIOR MARGIN, RIGHT BREAST MASS\": BREAST NEGATIVE FOR MALIGNANT TUMOR                           Interim history update in A/P. Review of Systems:       Constitutional   Denies fever or chills. Denies weight loss or appetite changes. Denies anorexia. Denies fatigue. HEENT   Glasses. Denies trauma, bluring vision, hearing loss, ear pain, nosebleeds, sore throat, neck  pain and ear discharge. Skin   Denies lesions or rashes.       Lungs   Denies cough, shortness of breath, sputum production or hemoptysis. Cardiovascular   Denies chest pain, palpitations, orthopnea, claudication and leg swelling. Gastrointestinal   Denies nausea, vomiting, bowel changes. Denies bloody or black stools. Denies abdominal pain.    Denies frequency, dysuria, hesitancy of urination. Neuro   Denies headaches, visual changes or ataxia. Denies dizziness, tingling, tremors, sensory change, speech change, focal weakness and headaches. Hematology   Hx blood clots. Denies nasal/gum bleeding, denies easy bruise       Endo   Denies heat/cold intolerance, denies diabetes. MSK   Denies back pain, swollen legs, myalgias and falls. Psychiatric/Behavioral   Denies depression and substance abuse. The patient is not nervous/anxious. Allergies   Allergen Reactions    Amitriptyline Other (See Comments)     Increased depression    Atorvastatin Hives    Clindamycin Other (See Comments)     C-Diff    Diphenhydramine Other (See Comments)     \"spacy feeling & memory loss for 10 days after taking oral benadryl\". Pt states benadryl cream is ok to use.      Sulfa Antibiotics Other (See Comments)     Bleeding     Sulfanilamide Other (See Comments)     Vaginal bleeding    Trimethoprim Nausea Only    Cephalexin Rash    Doxycycline Rash    Ibuprofen Anxiety    Nitrofurantoin Rash    Penicillins Rash     Past Medical History:   Diagnosis Date    Acute cystitis     Acute venous embolism and thrombosis of deep vessels of distal lower extremity (HCC)     Allergic rhinitis     takes allegra daily    Anemia 10/5/2015    Arthritis     knees    Asthma     \"intrinsic asthma\" allergic to certain odors (no inhalers); no meds; denies SOB w 1 flight of steps    Bladder neck obstruction     Breast cancer ([address] dx 7/14    right breast lumpectomy    Cancer ([address]     Cardiomyopathy ([address]     Chronic pain     with urination/ neck    Coagulation disorder ([address]     anemia    Cystocele, midline     Depression 10/5/2015 Dyspnea 10/5/2015    Dysuria     Female stress incontinence     Female stress incontinence     Heart failure ([facility]     History of anemia 2013    History of Clostridium difficile infection 2006    History of gestational diabetes     History of vertigo     last episode 10/14/14; takes meclizine prn    Hyperactivity of bladder     takes med    Hypertension     controlled by medication    IC (interstitial cystitis)     Ill-defined condition     Incomplete bladder emptying     Increased urinary frequency     takes med    Other chronic cystitis     Post-operative nausea and vomiting     many yrs ago after surgery    Postmenopausal atrophic vaginitis     Psychiatric disorder     cymbalta for stress & headaches    Seizures ([facility]     \"convusions at age 1\"  ? ?cause ? ?febrile seizure    Stress incontinence 10/5/2015    Thromboembolus ([facility] 1997, 2002    left leg \"DVT treated with elevation\"  \"no meds\"    Thromboembolus ([facility] 2006    right leg s/p cystocele (tx coumadin x 6 months    Urge incontinence     Urinary tract infection, site not specified     UTI (urinary tract infection)      Past Surgical History:   Procedure Laterality Date    BREAST BIOPSY Right 7/14    BREAST BIOPSY Left 1994    surgical    BREAST LUMPECTOMY  8/15/2014    RIGHT BREAST NEEDLE LOCALIZED LUMPECTOMY    PREOP @ 10:00 / NUC MED 11:45 / NEEDLE LOC 2:00 / SURGERY @ 4:30  performed by Maya Sanon MD at [facility]    breast benign lumpectomy    HERNIA REPAIR Right 2002    ventral    HYSTERECTOMY (CERVIX STATUS UNKNOWN)  1973    vaginal    OTHER SURGICAL HISTORY      PORT PLACEMENT    UROLOGICAL SURGERY  2006    cystocele repair    UROLOGICAL SURGERY  2000    cystocele repair    UROLOGICAL SURGERY  2005    cystocele and rectocele repair    UROLOGICAL SURGERY      vaginal sling urethropexy    UROLOGICAL SURGERY  2001    adhesions and nerve pain due to cystocele    VASCULAR SURGERY       Family History   Problem Relation Age of Onset    Cancer Sister     Coronary Art Dis Father     Hypertension Father     Heart Disease Father     Heart Disease Other         gen fam hx    Heart Disease Sister     Heart Disease Mother     Hypertension Other         gen fam hx    Diabetes Other         gen fam hx    Breast Cancer Sister 58     Social History     Socioeconomic History    Marital status:      Spouse name: Not on file    Number of children: Not on file    Years of education: Not on file    Highest education level: Not on file   Occupational History    Not on file   Tobacco Use    Smoking status: Never    Smokeless tobacco: Never   Substance and Sexual Activity    Alcohol use:  Yes     Alcohol/week: 0.8 standard drinks    Drug use: No     Types: Prescription, OTC    Sexual activity: Not on file   Other Topics Concern    Not on file   Social History Narrative    Not on file     Social Determinants of Health     Financial Resource Strain: Not on file   Food Insecurity: Not on file   Transportation Needs: Not on file   Physical Activity: Not on file   Stress: Not on file   Social Connections: Not on file   Intimate Partner Violence: Not on file   Housing Stability: Not on file     Current Outpatient Medications   Medication Sig Dispense Refill    Biotin 2.5 MG TABS Take 5,000 mcg by mouth daily      vitamin D 25 MCG (1000 UT) CAPS Take 5,000 Units by mouth 2 times daily      colesevelam (WELCHOL) 625 MG tablet Take 1,875 mg by mouth 2 times daily (with meals)      DULoxetine (CYMBALTA) 60 MG extended release capsule Take 60 mg by mouth      fexofenadine (ALLEGRA) 180 MG tablet Take by mouth      metFORMIN (GLUCOPHAGE-XR) 500 MG extended release tablet Take by mouth Daily with supper      methenamine (HIPREX) 1 g tablet Take 1 g by mouth 2 times daily (with meals)      metoprolol succinate (TOPROL XL) 25 MG extended release tablet TAKE 1 TABLET DAILY      rivaroxaban (XARELTO) 10 MG TABS tablet TAKE 1 TABLET DAILY WITH BREAKFAST       No current facility-administered medications for this visit. OBJECTIVE:  /69 (Position: Standing)   Pulse 87   Temp 97.7 °F (36.5 °C)   Resp 16   Ht 5' (1.524 m)   Wt 139 lb 12.8 oz (63.4 kg)   SpO2 99%   BMI 27.30 kg/m²     Physical Exam:      Constitutional:  Oriented to person, place, and time. Well-developed and well-nourished. HEENT:  Normocephalic and atraumatic. Oropharynx is clear and moist.    Conjunctivae and EOM are normal. Pupils are equal, round, and reactive to light. No scleral icterus. Neck supple. No JVD present. No tracheal deviation present. No thyromegaly present. Lymph node  No palpable submandibular, cervical, supraclavicular, axillary lymph nodes. Breasts  B/l soft nontender no mass. Skin  Warm and dry. No bruising and no rash noted. No erythema. No pallor. Respiratory  Effort normal and breath sounds normal.  No respiratory distress. No wheezes. No rales. No tenderness. CVS  Normal rate, regular rhythm and normal heart sounds. Exam reveals no gallop, no friction and no rub. No murmur heard. Abdomen  Soft. Bowel sounds are normal. Exhibits no distension. There is no tenderness. There is no rebound and no guarding. Neuro  Normal reflexes. No cranial nerve deficit. Exhibits normal muscle tone, 5 of 5 strength of all extremities. MSK  Normal range of motion in general.  No edema and no tenderness.      Psych  Normal mood, affect, behavior, judgment and thought content        Labs:  Recent Results (from the past 24 hour(s))   CBC with Auto Differential    Collection Time: 08/24/22 11:13 AM   Result Value Ref Range    WBC 7.4 4.3 - 11.1 K/uL    RBC 3.78 (L) 4.05 - 5.2 M/uL    Hemoglobin 12.0 11.7 - 15.4 g/dL    Hematocrit 36.7 35.8 - 46.3 %    MCV 97.1 79.6 - 97.8 FL    MCH 31.7 26.1 - 32.9 PG    MCHC 32.7 31.4 - 35.0 g/dL    RDW 12.7 11.9 - 14.6 %    Platelets 874 482 - 908 K/uL    MPV 10.0 9.4 - 12.3 FL    nRBC 0.00 0.0 - 0.2 K/uL    Differential Type AUTOMATED      Seg Neutrophils 55 43 - 78 %    Lymphocytes 29 13 - 44 %    Monocytes 9 4.0 - 12.0 %    Eosinophils % 8 (H) 0.5 - 7.8 %    Basophils 0 0.0 - 2.0 %    Immature Granulocytes 0 0.0 - 5.0 %    Segs Absolute 4.0 1.7 - 8.2 K/UL    Absolute Lymph # 2.1 0.5 - 4.6 K/UL    Absolute Mono # 0.6 0.1 - 1.3 K/UL    Absolute Eos # 0.6 0.0 - 0.8 K/UL    Basophils Absolute 0.0 0.0 - 0.2 K/UL    Absolute Immature Granulocyte 0.0 0.0 - 0.5 K/UL   Comprehensive Metabolic Panel    Collection Time: 08/24/22 11:13 AM   Result Value Ref Range    Sodium 137 136 - 145 mmol/L    Potassium 4.3 3.5 - 5.1 mmol/L    Chloride 107 98 - 107 mmol/L    CO2 24 21 - 32 mmol/L    Anion Gap 6 (L) 7 - 16 mmol/L    Glucose 153 (H) 65 - 100 mg/dL    BUN 34 (H) 8 - 23 MG/DL    Creatinine 1.30 (H) 0.6 - 1.0 MG/DL    GFR African American 52 (L) >60 ml/min/1.73m2    GFR Non- 43 (L) >60 ml/min/1.73m2    Calcium 8.8 8.3 - 10.4 MG/DL    Total Bilirubin 0.5 0.2 - 1.1 MG/DL    ALT 23 12 - 65 U/L    AST 18 15 - 37 U/L    Alk Phosphatase 49 (L) 50 - 136 U/L    Total Protein 7.4 6.3 - 8.2 g/dL    Albumin 3.7 3.2 - 4.6 g/dL    Globulin 3.7 (H) 2.3 - 3.5 g/dL    Albumin/Globulin Ratio 1.0 (L) 1.2 - 3.5         Imaging:  No results found for this or any previous visit. ASSESSMENT/PLAN:   Diagnosis Orders   1. Personal history of malignant neoplasm of breast        2. Age-related osteoporosis without current pathological fracture          66 y.o. F of good PS, h/o provoked DVT after long travel in 2001 without Rx and DVT post-op in 2006, found  right breast T1N0M0 breast cancer IDC ER99% PR0% HER2 2+IHC and ambiguous FISH, s/p right lumpectomy and SLND on 8/15/14. We discussed her  prognosis very good, the need of multidisciplinary Rx for the best outcome, the need for hormonal therapy for her strong ER+ with aromasin and should be compatible with her DVT history, the need for adjuvant right breat XRT.  We also discussed chemotherapy  is not strongly indicated in her case unless the HER2 FISH repeat is positive, and she was open the options, will have mammaprint symphony for both ER/IN and HER2 pathway analysis and the risk evaluation. Her right breast rash is probably due to unknown  allergy, used cortisone cream and resolved. Her repeat HER2 FISH turned out amplified. We discussed that was a strong indication for chemotherapy and she received it well, plan to have port placement and TCH x6 after her trip to [place], followed by XRT, 52 weeks of Herceptin, 5 years of AI. Exam  showed a left axilla LN, marked and send for US eval showed benign appearance. TargetPrint added ambiguity of the HER2 pathway activity and we discussed again, and she would rather proceed with chemotherapy to maximize her change of survival.      She returned and felt well, port placed for 11/4, started cycle 1 [facility] 11/6/14, had mucositis and dehydration received IVF, start MMW, instruct for Ensure and volume intake, neutropenic precaution educated, Cycle 2 was better tolerated, neutropenia but  ok to proceed with herceptin. Proceed to cycle 3 TCH, skipped day 8 for her Xmas travel, compenstate one dose of missing herceptin when return. She had a short course of diarrhea probably due to viral infection. She finished cycle 6 without complication,  but skipped the weekly herceptin probably due to miscommunication, proceeded to q3week herceptin and gave a loading dose to compensate the missing dose. She started XRT from 3/10/15. Discussed and started  aromasin from 3/2015, tolerated well, reported mild weakness and wobbling referred to onc rehab. Finished XRT 4/22/16,  Continue aromasin and herceptin q3wk, but given her planned traveling, made it  [id number] for next two doses of 8mg/m2. She had done well but again need a 4 week interval for travel and arrange according.  Mammogram showed no suspicion of malignancy but Exam showed left axillary LN of 1cm on palpation, marked and send for US but did  not find concerning feature, stable on exam. Repeat Echo. EF from April was 55% and in 2015 decreased to 40-45%. Hold treatment and referred to cardiology. No dyspnea, cough, lower extremity swelling. Feeling well overall. No new symptoms. We discussed to continue to hold herceptin and repeat Echo in 4  weeks 45-50%, discussed to  discontinue herceptin given she had received most of it and not HER2 strong+. Her   from cancer, referred her to Dr. Karen Blake to assist coping and doing much better. Follow cardiology and CHF better. Mammogram 2016  showed no suspicion of malignancy. She would like to remove the port. Her   and her in-law  later, feeling depressed and referred to Dr. Karen Blake and did much better, clinically and lab stable, enjoy traveling, mammogram 2019 showed no suspicion  of malignancy, continue reclast/vit D for osteoporosis and next DEXA 2021, requested hormonal therapy for thinning mucosa on internal exam by urology and rec not to do so given no symptoms, can use non-hormonal Replens or low dose intravaginal estrogen  pill/ring, managing interstitial cystitis with urology, finished aromasin after 3/2020, repeat mammogram 2022 showed no suspicion of malignancy, reported having a stroke earlier this year and planning to sell the house and go back to [place] to join his children, discussed to to establish with new PCP in [place] for regular care and continue annual mammogram/exam, continue vitamin D/calcium, give 1 more dose of Reclast, follow PCP and see oncology as needed. PLAN:   Breast cacner T1N0M0  s/p adjuvant chemo/herceptin/aromasin/XRT, annual exam/mammogram.   Baseline Echocardiogram EF 50-55%, Repeat 1/27/15 EF 50-55%. 55% on 4/22/15. 2015 down to 40-45%, follow cardiology.    DEXA 2017: osteoporosis, vit D/calcium, Reclast   Mammogram: 2020 no suspicion of malignancy   RTC as needed    All

## 2022-08-24 NOTE — PATIENT INSTRUCTIONS
Patient Instructions from Today's Visit    Reason for Visit:  Follow-up visit for breast cancer    Diagnosis Information:  [email or web address] net/about-us/asco-answers-patient-education-materials/zwkr-qrzfjcn-epkd-sheets    Plan:  -Reclast today for osteoporosis. -Mammogram looked good!     Follow Up:  -As needed - establish care with primary care doctor when you relocate to continue annual mammograms and Reclast    Recent Lab Results:  Hospital Outpatient Visit on 08/24/2022   Component Date Value Ref Range Status    WBC 08/24/2022 7.4  4.3 - 11.1 K/uL Final    RBC 08/24/2022 3.78 (A) 4.05 - 5.2 M/uL Final    Hemoglobin 08/24/2022 12.0  11.7 - 15.4 g/dL Final    Hematocrit 08/24/2022 36.7  35.8 - 46.3 % Final    MCV 08/24/2022 97.1  79.6 - 97.8 FL Final    MCH 08/24/2022 31.7  26.1 - 32.9 PG Final    MCHC 08/24/2022 32.7  31.4 - 35.0 g/dL Final    RDW 08/24/2022 12.7  11.9 - 14.6 % Final    Platelets 40/44/7156 188  150 - 450 K/uL Final    MPV 08/24/2022 10.0  9.4 - 12.3 FL Final    nRBC 08/24/2022 0.00  0.0 - 0.2 K/uL Final    **Note: Absolute NRBC parameter is now reported with Hemogram**    Differential Type 08/24/2022 AUTOMATED    Final    Seg Neutrophils 08/24/2022 55  43 - 78 % Final    Lymphocytes 08/24/2022 29  13 - 44 % Final    Monocytes 08/24/2022 9  4.0 - 12.0 % Final    Eosinophils % 08/24/2022 8 (A) 0.5 - 7.8 % Final    Basophils 08/24/2022 0  0.0 - 2.0 % Final    Immature Granulocytes 08/24/2022 0  0.0 - 5.0 % Final    Segs Absolute 08/24/2022 4.0  1.7 - 8.2 K/UL Final    Absolute Lymph # 08/24/2022 2.1  0.5 - 4.6 K/UL Final    Absolute Mono # 08/24/2022 0.6  0.1 - 1.3 K/UL Final    Absolute Eos # 08/24/2022 0.6  0.0 - 0.8 K/UL Final    Basophils Absolute 08/24/2022 0.0  0.0 - 0.2 K/UL Final    Absolute Immature Granulocyte 08/24/2022 0.0  0.0 - 0.5 K/UL Final    Sodium 08/24/2022 137  136 - 145 mmol/L Final    Potassium 08/24/2022 4.3  3.5 - 5.1 mmol/L Final    Chloride 08/24/2022 107  98 - 107 mmol/L Final    CO2 08/24/2022 24  21 - 32 mmol/L Final    Anion Gap 08/24/2022 6 (A) 7 - 16 mmol/L Final    Glucose 08/24/2022 153 (A) 65 - 100 mg/dL Final    BUN 08/24/2022 34 (A) 8 - 23 MG/DL Final    Creatinine 08/24/2022 1.30 (A) 0.6 - 1.0 MG/DL Final    GFR  08/24/2022 52 (A) >60 ml/min/1.73m2 Final    GFR Non- 08/24/2022 43 (A) >60 ml/min/1.73m2 Final    Comment:      Estimated GFR is calculated using the Modification of Diet in Renal Disease (MDRD) Study equation, reported for both  Americans (GFRAA) and non- Americans (GFRNA), and normalized to 1.73m2 body surface area. The physician must decide which value applies to the patient. The MDRD study equation should only be used in individuals age 25 or older. It has not been validated for the following: pregnant women, patients with serious comorbid conditions,or on certain medications, or persons with extremes of body size, muscle mass, or nutritional status.       Calcium 08/24/2022 8.8  8.3 - 10.4 MG/DL Final    Total Bilirubin 08/24/2022 0.5  0.2 - 1.1 MG/DL Final    ALT 08/24/2022 23  12 - 65 U/L Final    AST 08/24/2022 18  15 - 37 U/L Final    Alk Phosphatase 08/24/2022 49 (A) 50 - 136 U/L Final    Total Protein 08/24/2022 7.4  6.3 - 8.2 g/dL Final    Albumin 08/24/2022 3.7  3.2 - 4.6 g/dL Final    Globulin 08/24/2022 3.7 (A) 2.3 - 3.5 g/dL Final    Albumin/Globulin Ratio 08/24/2022 1.0 (A) 1.2 - 3.5   Final       Treatment Summary has been discussed and given to patient: N/A      -------------------------------------------------------------------------------------------------------------------  Please call our office at [phone] if you have any  of the following symptoms:   Fever of 100.5 or greater  Chills  Shortness of breath  Swelling or pain in one leg    After office hours an answering service is available and will contact a provider for emergencies or if you are experiencing any of the above symptoms. Patient does express an interest in My Chart. My Chart log in information explained on the after visit summary printout at the [facility] desk.     Enriqueta Dunn RN

## 2022-09-01 RX ORDER — DARIFENACIN HYDROBROMIDE 15 MG/1
TABLET, EXTENDED RELEASE ORAL
Qty: 90 TABLET | Refills: 3 | Status: SHIPPED | OUTPATIENT
Start: 2022-09-01

## 2022-09-15 ENCOUNTER — HOSPITAL ENCOUNTER (OUTPATIENT)
Dept: NUTRITION | Age: 72
Discharge: HOME | End: 2022-09-18
Payer: MEDICARE

## 2022-09-15 PROCEDURE — 97802 MEDICAL NUTRITION INDIV IN: CPT

## 2022-09-15 NOTE — PROGRESS NOTES
Kayla Simms, MS RD LD, [facility]  Outpatient Registered Dietitian  [address] [facility] Outpatient Nutrition Counseling  Phone: [phone]  Fax: [phone]    ASSESSMENT  Pt is a 66 y.o. 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 A1C= 5.9, random glucose 154mg/dL, glycemic management includes 500mg metformin daily, instructed to check daily fasting FSBG. Did not check today. GFR= 43 (L)    Patient stated goal:   Support with IC and renal diet. Presents an gabriel recommended by urologist for [address]. Presevration of renal function is a main goal. Prior knowledge of renal disease with her late . \"I do not use a salt shaker\". Eating behaviors and factors impacting food choices:   confusion on nutrition advice: used Internet to learn about renal friendly foods. Has a food gabriel for IC friendly foods seems as though she is not following these recommendations based on 3 day written recall. dining out/ready to heat and eat meals more than 50% of meals: High intake of highly foods and snacks upon review of 3 days recall. cooking for one: . poor snack choices: cakes, muffins, ice cream    Initial Diet Recall: (written)  Consumes 3 meals and 1 after dinner snack daily. Beverages include water, Ensure OTC  and fruit juices 2x per week, diet soda 1x per week,  coffee with cream  and artificially sweetened tea daily.   Daily consumption of sweet treat, chocolates, cakes, ice cream.     B: Cheerios + 2% milk with 1 cup berries, coffee with creamer  L: tiera ward delightful bread (contains inorganic phos) + 2T Peanut butter, ice water  D: Stouffers swedish meatballs microwave meal (1100mg sodium), pickled beets (145mg sodium), unsweet tea  PM Snack: 1/2 cup PB ice cream    Eating pattern appears excessive in energy, saturated fat, sodium, carbohydrate, and refined carbohydrate, and inadequate in fiber, fruit, and vegetables. Labs: reviewed,   Meds: reviewed, taking 1000units vitamin D3    Patient Active Problem List   Diagnosis    Urge incontinence    Stress incontinence    Cardiomyopathy ([address]    Atrophic vaginitis    Osteoporosis    Hypomagnesemia    Depression    Anemia    UTI (urinary tract infection)    Ejection fraction < 50%    Hypertension    Encounter for venous access device care    History of DVT of lower extremity    Dyspnea    Malignant neoplasm of lower-inner quadrant of right breast of female, estrogen receptor positive (HCC)    Nasal sinus congestion     Lifestyle/Family Influence/Support: Retired , , children live in [place], she plans to move closer. Movement includes: ADL  Stage of Change: Preparation. Has not accepted IC diet per her urologist yet based on food recall. Numbers person. Anthropometrics:   Estimated body mass index is 27.3 kg/m² as calculated from the following:    Height as of 8/24/22: 5' (1.524 m). Weight as of 8/24/22: 139 lb 12.8 oz (63.4 kg). Estimated Nutrition Needs:  MSJ x 1.2= 1275 cinthia/d            Estimated Needs:  Protein: diabetics 0.6-0.8g/kg/day 50g/day  Carbohydrate:  150 g/day (50% kcal)    Sodium: <2300mg/day             DIAGNOSIS:  Unbalanced diet related to nutrition knowledge deficit as evidenced by eating pattern as above.          INTERVENTION:    Recommendations:  Advised on how to select foods- starting with using IC list then check for inorganic phos and high sodium.  - Eat at regular intervals, every 3-5hrs  - <2300mg sodium/d  - 50g protein/day; incorporating both animal and plant based protein  - Increase fruit, vegetables, nut, seed, and whole grain intake to achieve 20-30g fiber/day and lower NEAP  - Avoid inorganic phosphates     Nutrition Education and Counseling (60 minutes)  Reviewed most recent labs and discussed priority modifications related to CKD/DM/IC MNT guidelines:   Briefly reviewed 2020 KDOQI guidelines, which encourage a whole foods diet, and support increasing fruit and vegetable intake to lower blood pressure, body weight, and NEAP. Stressed importance of sodium restriction. Reviewed high sodium foods, label reading, and encouraged limiting fast food/processed food/restaurant dining. Discussed protein foods and recommended intake. Used food models to demonstrate appropriate portions. Reviewed organic vs. Inorganic phosphates and differences in absorption. Reviewed label reading for phosphate additives. Discussed the current guidelines support the inclusion of low fat dairy, nuts, and seeds. Application: Collaborated with pt to review food labels and identify how to select foods that are renal diabetes friendly. Pt was able to reviewed a food label and identify if it was healthy choice for her or not. via teach back. Materials Provided and Discussed:  MNT Guidelines for Renal Disease  MyPlate Food Choice Examples   ADA glycemic fasting and 2hr PP  Utilized the following behavior change strategies: MI, goal setting. Patient verbalized understanding of recommendations discussed. Goal: reduce sodium and inorganic phos in diet  Action Steps:           1. Aim for 600 mg sodium per meal  2. Review food labels for inorganic phos using handout and a guide. 3. Reduce added sugars to < 25 grams per day. 4. Try items of grocery list generated together. MONITORING & EVALUATION:  Monitor Food and Nutrient Intake and Biochemical  Follow Up: PRN- pt states she is moving to [place].

## 2022-10-07 DIAGNOSIS — N39.0 URINARY TRACT INFECTION WITHOUT HEMATURIA, SITE UNSPECIFIED: Primary | ICD-10-CM

## 2022-10-10 ENCOUNTER — TELEPHONE (OUTPATIENT)
Dept: UROLOGY | Age: 72
End: 2022-10-10

## 2022-10-12 RX ORDER — METHENAMINE HIPPURATE 1000 MG/1
1 TABLET ORAL 2 TIMES DAILY WITH MEALS
Qty: 180 TABLET | Refills: 0 | Status: SHIPPED | OUTPATIENT
Start: 2022-10-12 | End: 2023-01-10

## 2022-10-12 NOTE — PROGRESS NOTES
Diagnosis Orders   1.  Urinary tract infection without hematuria, site unspecified  methenamine (HIPREX) 1 g tablet        I eprescribed the med

## 2022-12-05 RX ORDER — RIVAROXABAN 10 MG/1
TABLET, FILM COATED ORAL
Qty: 90 TABLET | Refills: 3 | Status: SHIPPED | OUTPATIENT
Start: 2022-12-05

## 2022-12-05 RX ORDER — METOPROLOL SUCCINATE 25 MG/1
TABLET, EXTENDED RELEASE ORAL
Qty: 90 TABLET | Refills: 3 | Status: SHIPPED | OUTPATIENT
Start: 2022-12-05

## 2023-01-23 ENCOUNTER — OFFICE VISIT (OUTPATIENT)
Dept: UROLOGY | Age: 73
End: 2023-01-23
Payer: MEDICARE

## 2023-01-23 DIAGNOSIS — N32.81 OAB (OVERACTIVE BLADDER): ICD-10-CM

## 2023-01-23 DIAGNOSIS — N30.00 ACUTE CYSTITIS WITHOUT HEMATURIA: ICD-10-CM

## 2023-01-23 DIAGNOSIS — N39.0 URINARY TRACT INFECTION WITHOUT HEMATURIA, SITE UNSPECIFIED: ICD-10-CM

## 2023-01-23 DIAGNOSIS — N95.2 ATROPHIC VAGINITIS: ICD-10-CM

## 2023-01-23 DIAGNOSIS — I63.9 CEREBROVASCULAR ACCIDENT (CVA), UNSPECIFIED MECHANISM (HCC): ICD-10-CM

## 2023-01-23 DIAGNOSIS — N30.10 INTERSTITIAL CYSTITIS (CHRONIC) WITHOUT HEMATURIA: ICD-10-CM

## 2023-01-23 DIAGNOSIS — N30.01 ACUTE CYSTITIS WITH HEMATURIA: Primary | ICD-10-CM

## 2023-01-23 DIAGNOSIS — Z86.718 HISTORY OF DVT OF LOWER EXTREMITY: ICD-10-CM

## 2023-01-23 LAB
BILIRUBIN, URINE, POC: NEGATIVE
BLOOD URINE, POC: NORMAL
GLUCOSE URINE, POC: NEGATIVE
KETONES, URINE, POC: NEGATIVE
LEUKOCYTE ESTERASE, URINE, POC: NORMAL
NITRITE, URINE, POC: POSITIVE
PH, URINE, POC: 5.5 (ref 4.6–8)
PROTEIN,URINE, POC: 30
SPECIFIC GRAVITY, URINE, POC: 1.02 (ref 1–1.03)
URINALYSIS CLARITY, POC: NORMAL
URINALYSIS COLOR, POC: NORMAL
UROBILINOGEN, POC: 0.2

## 2023-01-23 PROCEDURE — G8484 FLU IMMUNIZE NO ADMIN: HCPCS | Performed by: UROLOGY

## 2023-01-23 PROCEDURE — G8417 CALC BMI ABV UP PARAM F/U: HCPCS | Performed by: UROLOGY

## 2023-01-23 PROCEDURE — 81003 URINALYSIS AUTO W/O SCOPE: CPT | Performed by: UROLOGY

## 2023-01-23 PROCEDURE — G8399 PT W/DXA RESULTS DOCUMENT: HCPCS | Performed by: UROLOGY

## 2023-01-23 PROCEDURE — 1036F TOBACCO NON-USER: CPT | Performed by: UROLOGY

## 2023-01-23 PROCEDURE — G8428 CUR MEDS NOT DOCUMENT: HCPCS | Performed by: UROLOGY

## 2023-01-23 PROCEDURE — 1123F ACP DISCUSS/DSCN MKR DOCD: CPT | Performed by: UROLOGY

## 2023-01-23 PROCEDURE — 1090F PRES/ABSN URINE INCON ASSESS: CPT | Performed by: UROLOGY

## 2023-01-23 PROCEDURE — 3017F COLORECTAL CA SCREEN DOC REV: CPT | Performed by: UROLOGY

## 2023-01-23 PROCEDURE — 99213 OFFICE O/P EST LOW 20 MIN: CPT | Performed by: UROLOGY

## 2023-01-23 RX ORDER — DARIFENACIN HYDROBROMIDE 15 MG/1
TABLET, EXTENDED RELEASE ORAL
Qty: 90 TABLET | Refills: 3 | Status: SHIPPED | OUTPATIENT
Start: 2023-01-23

## 2023-01-23 RX ORDER — PAROXETINE 10 MG/5ML
SUSPENSION ORAL EVERY MORNING
COMMUNITY

## 2023-01-23 NOTE — PROGRESS NOTES
[facility]  [address]  [address] [facility]  [facility], [address] [facility]  [phone]    Kika Wu  : 1950    Chief Complaint   Patient presents with    Urinary Tract Infection        HPI     Kika Wu is a 74 y.o. female with hx of cystocele, eosinophilic cystitis. Patient had cystocele repair with Perigee system on 2006. Had DVT postop. Had a small area of exposed mesh in ., started on Estrace cream.    Started on Estrace vaginal cream. No exposed mesh when seen in 3-10. Cystoscopy showed a grade 2 cystocele. This appeared to be distal to the previous sling. Negative MMK test.    She continued to have incontinence as well as postvoid dribbling. On 12 she had urethrolysis, Anterior Elevate repair . Was using Estrace and Enablex. Had been on Trimethoprim suppression but got C.diff. enterocolitis. States that she got a rash with Macrodantin once, but has taken it several times We tried Macrodantin daily in  but she got a rash again . Can take Cipro without problem. Takes Enablex 15mg daily. Was on Estrace cream 3x/week, but stopped it in . Was seen in , pelvic showed possible small area of exposed mesh. Had chemotherapy for breast cancer. Also XRT. Has had recurrent UTI. Has occasional spotting. She is not bothered by this. Spotting may be from exposed vaginal mesh. Cant give estrogen cream because of recent breast cancer. She does not have any sxs , will follow for now. Continues Enablex. Had reaction to Trimethoprim. Takes Hiprex. Has had bloody vaginal spotting in  on 2 occasions . Exam in  showed atrophic vaginal mucosa , no exposed mesh. Having pain when voiding , left flank pain, occasional vaginal spotting. had pyuria by cath urine but negative culture. Still having pain with voiding and hematuria. cysto in 10-18 showed diffuse cystitis.        On 18 had cysto in OR:  Cystoscopy, hydrodistention of bladder, bladder biopsy, excision of vaginal mesh measuring 1 cm in length     FINDINGS:  Bladder shows diffuse areas of cystitis and after hydrodistention, there are ulcerated areas in the mucosa consistent with Hunner's ulcers. Also, an area of exposed vaginal mesh on the left distal anterior vaginal wall, which was excised completely. Pathology:    DIAGNOSIS   BLADDER BIOPSIES: CHRONIC CYSTITIS WITH INCREASED EOSINOPHILS. She states that the sxs are better, pain is better. Back on Xarelto. Sxs likely due to IC. Discussed options of Elmiron, bladder instillations. She will consult the [facility] website concerning diet. She is intolerant of Amitriptyline. Has completed round of bladder instillations. She feels that she is doing better. IC prudent diet is improving her sxs . Started on bladder instillations. Oncology does not want her to have any vaginal estrogen cream.   Has been using Replens vaginal moisturizing cream. Having some vaginal discharge, but no bleeding . She has been taken off of Aromasin. She is taking Allegra, which may help her eosinophilic cystitis. Her sxs are better . low acid diet is helping. Likes Prelief. Continues Enablex, hiprex, Prelief prn. continues Xarelto. Came in with c/o blood on tissue after voiding and dysuria. Exam 11-20: no bleeding noted. Vagina stenotic, no palpable mesh or bleeding noted, nontender . She went to an allergist. Is not allergic to PCN, sulfa, macrodantin or latex. She has occasional spotting and some dysuria. she was in an online relationship.,she got swindled out of $250,000 in 2021. There is some concern by her PCP that the may have some issues with her cognition. she has seen [facility] neurology and had brain MRI, this showed evidence of old stroke. MRI 12-15-21: 1. No acute intracranial abnormality. 2. Global parenchymal volume loss with mild bilateral hippocampal atrophy.  No disproportionate midbrain volume loss or asymmetric lobar atrophy. 3. Remote lacunar infarction in the left caudate nucleus and likely right cerebellum a mild burden of chronic small vessel disease, which is not advanced for the patient's age. She is still on Hiprex .has occasional spotting. Today she states that the is no longer spotting, since she had a colon polyp removed. Current meds Xarelto, Enablex, Hiprex. She denies dysuria or hematuria. Past Medical History:   Diagnosis Date    Acute cystitis     Acute venous embolism and thrombosis of deep vessels of distal lower extremity (HCC)     Allergic rhinitis     takes allegra daily    Anemia 10/5/2015    Arthritis     knees    Asthma     \"intrinsic asthma\" allergic to certain odors (no inhalers); no meds; denies SOB w 1 flight of steps    Bladder neck obstruction     Breast cancer ([address] dx 7/14    right breast lumpectomy    Cancer ([address]     Cardiomyopathy ([address]     Chronic pain     with urination/ neck    Coagulation disorder ([address]     anemia    Cystocele, midline     Depression 10/5/2015    Dyspnea 10/5/2015    Dysuria     Female stress incontinence     Female stress incontinence     Heart failure ([address]     History of anemia 2013    History of Clostridium difficile infection 2006    History of gestational diabetes     History of vertigo     last episode 10/14/14; takes meclizine prn    Hyperactivity of bladder     takes med    Hypertension     controlled by medication    IC (interstitial cystitis)     Ill-defined condition     Incomplete bladder emptying     Increased urinary frequency     takes med    Other chronic cystitis     Post-operative nausea and vomiting     many yrs ago after surgery    Postmenopausal atrophic vaginitis     Psychiatric disorder     cymbalta for stress & headaches    Seizures ([address]     \"convusions at age 1\"  ? ?cause ? ?febrile seizure    Stress incontinence 10/5/2015    Thromboembolus ([address] 1997, 2002    left leg \"DVT treated with elevation\"  \"no meds\" [address] [address] [facility] 2006    right leg s/p cystocele (tx coumadin x 6 months    Urge incontinence     Urinary tract infection, site not specified     UTI (urinary tract infection)      Past Surgical History:   Procedure Laterality Date    BREAST BIOPSY Right 7/14    BREAST BIOPSY Left 1994    surgical    BREAST LUMPECTOMY  8/15/2014    RIGHT BREAST NEEDLE LOCALIZED LUMPECTOMY    PREOP @ 10:00 / NUC MED 11:45 / NEEDLE LOC 2:00 / SURGERY @ 4:30  performed by Ryan Grfa MD at [facility]    breast benign lumpectomy    HERNIA REPAIR Right 2002    ventral    HYSTERECTOMY (CERVIX STATUS UNKNOWN)  1973    vaginal    OTHER SURGICAL HISTORY      PORT PLACEMENT    UROLOGICAL SURGERY  2006    cystocele repair    UROLOGICAL SURGERY  2000    cystocele repair    UROLOGICAL SURGERY  2005    cystocele and rectocele repair    UROLOGICAL SURGERY      vaginal sling urethropexy    UROLOGICAL SURGERY  2001    adhesions and nerve pain due to cystocele    VASCULAR SURGERY       Current Outpatient Medications   Medication Sig Dispense Refill    PARoxetine (PAXIL) 10 MG/5ML suspension Take by mouth every morning      darifenacin (ENABLEX) 15 MG extended release tablet TAKE 1 TABLET DAILY 90 tablet 3    metoprolol succinate (TOPROL XL) 25 MG extended release tablet TAKE 1 TABLET DAILY 90 tablet 3    XARELTO 10 MG TABS tablet TAKE 1 TABLET DAILY WITH BREAKFAST 90 tablet 3    Biotin 2.5 MG TABS Take 5,000 mcg by mouth daily      vitamin D 25 MCG (1000 UT) CAPS Take 5,000 Units by mouth 2 times daily      colesevelam (WELCHOL) 625 MG tablet Take 1,875 mg by mouth 2 times daily (with meals)      fexofenadine (ALLEGRA) 180 MG tablet Take by mouth      metFORMIN (GLUCOPHAGE-XR) 500 MG extended release tablet Take by mouth Daily with supper       No current facility-administered medications for this visit.      Allergies   Allergen Reactions    Amitriptyline Other (See Comments)     Increased depression Atorvastatin Hives    Clindamycin Other (See Comments)     C-Diff    Diphenhydramine Other (See Comments)     \"spacy feeling & memory loss for 10 days after taking oral benadryl\". Pt states benadryl cream is ok to use. Sulfa Antibiotics Other (See Comments)     Bleeding     Sulfanilamide Other (See Comments)     Vaginal bleeding    Trimethoprim Nausea Only    Cephalexin Rash    Doxycycline Rash    Ibuprofen Anxiety    Nitrofurantoin Rash    Penicillins Rash     Social History     Socioeconomic History    Marital status:      Spouse name: Not on file    Number of children: Not on file    Years of education: Not on file    Highest education level: Not on file   Occupational History    Not on file   Tobacco Use    Smoking status: Never    Smokeless tobacco: Never   Substance and Sexual Activity    Alcohol use: Yes     Alcohol/week: 0.8 standard drinks    Drug use: No     Types: Prescription, OTC    Sexual activity: Not on file   Other Topics Concern    Not on file   Social History Narrative    Not on file     Social Determinants of Health     Financial Resource Strain: Not on file   Food Insecurity: Not on file   Transportation Needs: Not on file   Physical Activity: Not on file   Stress: Not on file   Social Connections: Not on file   Intimate Partner Violence: Not on file   Housing Stability: Not on file     Family History   Problem Relation Age of Onset    Cancer Sister     Coronary Art Dis Father     Hypertension Father     Heart Disease Father     Heart Disease Other         gen fam hx    Heart Disease Sister     Heart Disease Mother     Hypertension Other         gen fam hx    Diabetes Other         gen fam hx    Breast Cancer Sister 58       ROS    There were no vitals taken for this visit.     Urinalysis  UA - Dipstick  Results for orders placed or performed in visit on 01/23/23   AMB POC URINALYSIS DIP STICK AUTO W/O MICRO   Result Value Ref Range    Color, Urine, POC      Clarity, Urine, POC Glucose, Urine, POC Negative Negative    Bilirubin, Urine, POC Negative Negative    Ketones, Urine, POC Negative Negative    Specific Gravity, Urine, POC 1.020 1.001 - 1.035    Blood, Urine, POC Small Negative    pH, Urine, POC 5.5 4.6 - 8.0    Protein, Urine, POC 30 Negative    Urobilinogen, POC 0.2     Nitrate, Urine, POC Positive Negative    Leukocyte Esterase, Urine, POC Large Negative   Results for orders placed or performed in visit on 06/27/22   AMB POC URINALYSIS DIP STICK AUTO W/O MICRO   Result Value Ref Range    Color (UA POC)      Clarity (UA POC)      Glucose, Urine, POC Negative Negative    Bilirubin, Urine, POC Negative Negative    KETONES, Urine, POC Negative Negative    Specific Gravity, Urine, POC 1.025 1.001 - 1.035    Blood (UA POC) Moderate Negative    pH, Urine, POC 5.5 4.6 - 8.0    Protein, Urine, POC 30 Negative    Urobilinogen, POC Normal     Nitrite, Urine, POC Positive Negative    Leukocyte Esterase, Urine, POC Large Negative       UA - Micro  WBC - tntc  RBC - 0  Bacteria - 3+  Epith - 0    Physical Exam    Assessment and Plan    Alejandro Abrahambean was seen today for urinary tract infection. 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 Will send culture, Call patient with results  May not need to treat UTI. She has seen neurology at [facility], has diagnosis of dementia she would like a second opinion. Will refer to [facility] neurology. Imaging has shows a previous stroke.    Follow-up and Dispositions Return in about 4 months (around 5/23/2023).

## 2023-01-25 ENCOUNTER — TELEPHONE (OUTPATIENT)
Dept: UROLOGY | Age: 73
End: 2023-01-25

## 2023-01-25 NOTE — TELEPHONE ENCOUNTER
----- Message from Alona Gibbons sent at 1/24/2023  1:29 PM EST -----  Regarding: FW: Incorrect medication    ----- Message -----  From: Arturo Augustine  Sent: 1/24/2023   1:19 PM EST  To: , #  Subject: Incorrect medication                             Dr. Page Brambila yesterday you sent over to [facility] a refill for Enablex. It was supposed to be for Hiprex. Please send a prescription Hiprex 1GM, two times a day for 90 days with 3 refills. [facility] number is [phone].   Thanks a luisch, Luba Paez ([phone])

## 2023-01-26 ENCOUNTER — TELEPHONE (OUTPATIENT)
Dept: UROLOGY | Age: 73
End: 2023-01-26

## 2023-01-26 DIAGNOSIS — N39.0 URINARY TRACT INFECTION WITHOUT HEMATURIA, SITE UNSPECIFIED: Primary | ICD-10-CM

## 2023-01-26 RX ORDER — METHENAMINE HIPPURATE 1000 MG/1
1 TABLET ORAL 2 TIMES DAILY WITH MEALS
Qty: 180 TABLET | Refills: 3 | Status: SHIPPED | OUTPATIENT
Start: 2023-01-26 | End: 2023-04-26

## 2023-01-26 NOTE — TELEPHONE ENCOUNTER
----- Message from Isabela Long sent at 1/26/2023  8:25 AM EST -----  Regarding: FW: Incorrect medication    ----- Message -----  From: Laura Gomez  Sent: 1/25/2023   6:25 PM EST  To: , #  Subject: Incorrect medication                             Dr. Scott Chi,    My prescription for Hiprex  (Methenamine Joey) is for 1GM Tablet twice a day. You usually write for 90 days with 3 refills. Thanks a bunch.   Chela Johnson ([phone])

## 2023-01-27 ENCOUNTER — OFFICE VISIT (OUTPATIENT)
Dept: CARDIOLOGY CLINIC | Age: 73
End: 2023-01-27

## 2023-01-27 VITALS
BODY MASS INDEX: 27.09 KG/M2 | HEIGHT: 60 IN | DIASTOLIC BLOOD PRESSURE: 75 MMHG | HEART RATE: 64 BPM | WEIGHT: 138 LBS | SYSTOLIC BLOOD PRESSURE: 130 MMHG

## 2023-01-27 DIAGNOSIS — I42.7 CARDIOMYOPATHY DUE TO DRUG AND EXTERNAL AGENT (HCC): Primary | ICD-10-CM

## 2023-01-27 DIAGNOSIS — Z86.79 HISTORY OF CONGESTIVE HEART FAILURE: ICD-10-CM

## 2023-01-27 DIAGNOSIS — I10 PRIMARY HYPERTENSION: ICD-10-CM

## 2023-01-27 DIAGNOSIS — Z86.718 HISTORY OF DVT OF LOWER EXTREMITY: ICD-10-CM

## 2023-01-27 LAB
BACTERIA SPEC CULT: ABNORMAL
BACTERIA SPEC CULT: ABNORMAL
SERVICE CMNT-IMP: ABNORMAL

## 2023-01-27 ASSESSMENT — ENCOUNTER SYMPTOMS
STRIDOR: 0
ABDOMINAL PAIN: 0
APHONIA: 0
COUGH: 0
EYE PAIN: 0
NAIL CHANGES: 0

## 2023-01-27 NOTE — PROGRESS NOTES
[address] [place], [place]  [address], [address] [place] [address], [address] [place] [address]  PHONE: [phone]    SUBJECTIVE:   Kavita Cedillo is a 74 y.o. female 1950   seen for a follow up visit regarding the following:     Chief Complaint   Patient presents with    Cardiomyopathy    Hypertension         History of present illness: 74 y.o. female presented for follow-up 1/27/23 at her last appointment the patient was planning on moving to [place] to be closer to family. Unfortunately she lost considerable amount of money. She has not moved yet. She plans to move soon to be with her her son. Interval Hx    Previous history of cardiomyopathy, which resolved, as well as elevated pulmonary pressures previously detected on echocardiogram.        She was originally seen in 07/2015 with diminished left ventricular systolic function on echocardiogram.  She underwent left and right heart catheterization angiogram, which revealed no obstructive coronary disease. Pulmonary pressures were of concern  previously, but not profoundly elevated based on right heart catheterization numbers. Cardiac History:     History of DVT on full dose aspirin. Echo 2015 EF 40-45%    Cardiac catheterization in 08/2015 with angiographically normal coronary arteries. Right heart catheterization was done due to her history of elevated right ventricular systolic pressures. Right heart catheterization showed right atrial pressure of  9, RV 35/80, PA 33/14, mean PA pressure 21 mmHg, A wave of 12, V wave of 15, pulmonary capillary wedge pressure 11, cardiac output 4.2 meters/minute, cardiac index 2.16 liters/minute by Traci. The patient's  passed away unexpectedly from esophageal cancer in 10/2015.     Echocardiogram 03/2017 - normal left ventricular systolic function, normal RVSP.      5/2016 CT with findings concerning for early pulmonary fibrosis     10/2017 xray reported as normal     6/2018 echo normal EF 6/2018 CT chest no change     12/11/20 Sinus  Rhythm Low voltage in precordial leads. 11/4/20201 sinus rhythm, normal rate, normal OK intervals, ST wave normal, normal axis,  12/7/2023 Sinus  Rhythm WITHIN NORMAL LIMITS      Assessment and Plan:       History of cardiomyopathy     attributed to Herceptin therapy. The patient is now off medication with recent mammogram in 07/2017. Followed by Dr. Kennedy Murphy. Patient is moving to [facility] in [facility]. [facility]    HTN  Key CAD CHF Meds            metoprolol succinate (TOPROL XL) 25 MG extended release tablet (Taking)    Sig: TAKE 1 TABLET DAILY    XARELTO 10 MG TABS tablet (Taking)    Sig: TAKE 1 TABLET DAILY WITH BREAKFAST    colesevelam (WELCHOL) 625 MG tablet (Taking)    Class: Historical Med           DVT  On xarleto     Current Outpatient Medications   Medication Sig    methenamine (HIPREX) 1 g tablet Take 1 tablet by mouth 2 times daily (with meals)    PARoxetine (PAXIL) 10 MG/5ML suspension Take by mouth every morning    darifenacin (ENABLEX) 15 MG extended release tablet TAKE 1 TABLET DAILY    metoprolol succinate (TOPROL XL) 25 MG extended release tablet TAKE 1 TABLET DAILY    XARELTO 10 MG TABS tablet TAKE 1 TABLET DAILY WITH BREAKFAST    vitamin D 25 MCG (1000 UT) CAPS Take 5,000 Units by mouth 2 times daily    colesevelam (WELCHOL) 625 MG tablet Take 1,875 mg by mouth 2 times daily (with meals)    fexofenadine (ALLEGRA) 180 MG tablet Take by mouth    metFORMIN (GLUCOPHAGE-XR) 500 MG extended release tablet Take by mouth Daily with supper    Biotin 2.5 MG TABS Take 5,000 mcg by mouth daily     No current facility-administered medications for this visit. Past Medical History, Past Surgical History, Family history, Social History, and Medications were all reviewed with the patient today and updated as necessary.        Allergies   Allergen Reactions    Amitriptyline Other (See Comments) Increased depression    Atorvastatin Hives    Clindamycin Other (See Comments)     C-Diff    Diphenhydramine Other (See Comments)     \"spacy feeling & memory loss for 10 days after taking oral benadryl\". Pt states benadryl cream is ok to use. Sulfa Antibiotics Other (See Comments)     Bleeding     Sulfanilamide Other (See Comments)     Vaginal bleeding    Trimethoprim Nausea Only    Cephalexin Rash    Doxycycline Rash    Ibuprofen Anxiety    Nitrofurantoin Rash    Penicillins Rash     Past Medical History:   Diagnosis Date    Acute cystitis     Acute venous embolism and thrombosis of deep vessels of distal lower extremity (HCC)     Allergic rhinitis     takes allegra daily    Anemia 10/5/2015    Arthritis     knees    Asthma     \"intrinsic asthma\" allergic to certain odors (no inhalers); no meds; denies SOB w 1 flight of steps    Bladder neck obstruction     Breast cancer ([address] dx 7/14    right breast lumpectomy    Cancer ([address]     Cardiomyopathy ([address]     Chronic pain     with urination/ neck    Coagulation disorder ([address]     anemia    Cystocele, midline     Depression 10/5/2015    Dyspnea 10/5/2015    Dysuria     Female stress incontinence     Female stress incontinence     Heart failure ([address]     History of anemia 2013    History of Clostridium difficile infection 2006    History of gestational diabetes     History of vertigo     last episode 10/14/14; takes meclizine prn    Hyperactivity of bladder     takes med    Hypertension     controlled by medication    IC (interstitial cystitis)     Ill-defined condition     Incomplete bladder emptying     Increased urinary frequency     takes med    Other chronic cystitis     Post-operative nausea and vomiting     many yrs ago after surgery    Postmenopausal atrophic vaginitis     Psychiatric disorder     cymbalta for stress & headaches    Seizures ([address]     \"convusions at age 1\"  ? ?cause ? ?febrile seizure    Stress incontinence 10/5/2015    Thromboembolus ([address] 1997, 2002    left leg \"DVT treated with elevation\"  \"no meds\"    Thromboembolus ([address] 2006    right leg s/p cystocele (tx coumadin x 6 months    Urge incontinence     Urinary tract infection, site not specified     UTI (urinary tract infection)      Past Surgical History:   Procedure Laterality Date    BREAST BIOPSY Right 7/14    BREAST BIOPSY Left 1994    surgical    BREAST LUMPECTOMY  8/15/2014    RIGHT BREAST NEEDLE LOCALIZED LUMPECTOMY    PREOP @ 10:00 / NUC MED 11:45 / NEEDLE LOC 2:00 / SURGERY @ 4:30  performed by Mj Alva MD at [facility] Left 1994    breast benign lumpectomy    HERNIA REPAIR Right 2002    ventral    HYSTERECTOMY (CERVIX STATUS UNKNOWN)  1973    vaginal    OTHER SURGICAL HISTORY      PORT PLACEMENT    UROLOGICAL SURGERY  2006    cystocele repair    UROLOGICAL SURGERY  2000    cystocele repair    UROLOGICAL SURGERY  2005    cystocele and rectocele repair    UROLOGICAL SURGERY      vaginal sling urethropexy    UROLOGICAL SURGERY  2001    adhesions and nerve pain due to cystocele    VASCULAR SURGERY       Family History   Problem Relation Age of Onset    Cancer Sister     Coronary Art Dis Father     Hypertension Father     Heart Disease Father     Heart Disease Other         gen fam hx    Heart Disease Sister     Heart Disease Mother     Hypertension Other         gen fam hx    Diabetes Other         gen fam hx    Breast Cancer Sister 58      Social History     Tobacco Use    Smoking status: Never    Smokeless tobacco: Never   Substance Use Topics    Alcohol use: Yes     Alcohol/week: 0.8 standard drinks       ROS:    Review of Systems   Constitutional: Negative for fever. HENT:  Negative for stridor. Eyes:  Negative for pain. Cardiovascular:  Negative for chest pain. Respiratory:  Negative for cough. Endocrine: Negative for cold intolerance. Skin:  Negative for nail changes. Musculoskeletal:  Negative for arthritis.    Gastrointestinal:  Negative for abdominal pain. Genitourinary:  Negative for dysuria. Neurological:  Negative for aphonia. Psychiatric/Behavioral:  Negative for altered mental status. Allergic/Immunologic: Negative for hives. PHYSICAL EXAM:    /75   Pulse 64   Ht 5' (1.524 m)   Wt 138 lb (62.6 kg)   BMI 26.95 kg/m²        Wt Readings from Last 3 Encounters:   01/27/23 138 lb (62.6 kg)   08/24/22 139 lb 12.8 oz (63.4 kg)   05/09/22 140 lb (63.5 kg)     BP Readings from Last 3 Encounters:   01/27/23 130/75   08/24/22 112/69   05/09/22 134/72         Physical Exam  Vitals reviewed. HENT:      Head: Normocephalic. Right Ear: External ear normal.      Left Ear: External ear normal.      Nose: Nose normal.   Eyes:      General: No scleral icterus. Pulmonary:      Effort: Pulmonary effort is normal.   Abdominal:      General: There is no distension. Musculoskeletal:      Cervical back: Neck supple. Skin:     General: Skin is warm. Neurological:      Mental Status: She is alert. Mental status is at baseline. Medical problems and test results were reviewed with the patient today.            Recent Results (from the past 672 hour(s))   AMB POC URINALYSIS DIP STICK AUTO W/O MICRO    Collection Time: 01/23/23  2:52 PM   Result Value Ref Range    Color, Urine, POC      Clarity, Urine, POC      Glucose, Urine, POC Negative Negative    Bilirubin, Urine, POC Negative Negative    Ketones, Urine, POC Negative Negative    Specific Gravity, Urine, POC 1.020 1.001 - 1.035    Blood, Urine, POC Small Negative    pH, Urine, POC 5.5 4.6 - 8.0    Protein, Urine, POC 30 Negative    Urobilinogen, POC 0.2     Nitrate, Urine, POC Positive Negative    Leukocyte Esterase, Urine, POC Large Negative   Culture, Urine    Collection Time: 01/23/23  3:52 PM    Specimen: Urine, clean catch   Result Value Ref Range    Special Requests NO SPECIAL REQUESTS      Culture >100,000 COLONIES/mL Escherichia coli (A)      Culture <10,000 COLONIES/mL MIXED SKIN IRIS ISOLATED         Susceptibility    Escherichia coli - BACTERIAL SUSCEPTIBILITY PANEL MARY GRACE     trimethoprim-sulfamethoxazole <=0.5/9.5 Sensitive ug/mL     nitrofurantoin 32 Sensitive ug/mL     ampicillin <=4 Sensitive ug/mL     ciprofloxacin <=0.25 Sensitive ug/mL     gentamicin <=2 Sensitive ug/mL     tobramycin <=2 Sensitive ug/mL     ampicillin-sulbactam 4/2 Sensitive ug/mL     ceFAZolin 4 Sensitive ug/mL     cefTRIAXone <=1 Sensitive ug/mL     cefepime <=1 Sensitive ug/mL     piperacillin-tazobactam <=2/4 Sensitive ug/mL     aztreonam <=2 Sensitive ug/mL     No results found for: CHOL, CHOLPOCT, CHOLX, CHLST, CHOLV, HDL, HDLPOC, HDLC, LDL, LDLC, VLDLC, VLDL, TGLX, TRIGL    No results found for any visits on 01/27/23. Juan Carlos Mejias was seen today for cardiomyopathy and hypertension. 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        Raven Mendez MD  1/27/2023  11:08 AM

## 2023-02-02 ENCOUNTER — TELEPHONE (OUTPATIENT)
Dept: UROLOGY | Age: 73
End: 2023-02-02

## 2023-02-02 ENCOUNTER — TELEPHONE (OUTPATIENT)
Dept: NUTRITION | Age: 73
End: 2023-02-02

## 2023-02-02 DIAGNOSIS — N39.0 URINARY TRACT INFECTION WITHOUT HEMATURIA, SITE UNSPECIFIED: Primary | ICD-10-CM

## 2023-02-02 NOTE — TELEPHONE ENCOUNTER
Urine culture showed E coli UTI  She had no sxs in the office. If she is still asymptomatic, she doesn't need any abx.   Keep appt

## 2023-02-02 NOTE — TELEPHONE ENCOUNTER
Nutrition Counseling: Contacted pt regarding referral. See notes documented in Nutrition Counseling Referral for details. No further follow-up contact from pt. Will close referral for this office and notify referring provider.     [facility]  [phone]

## 2023-02-03 RX ORDER — CIPROFLOXACIN 250 MG/1
250 TABLET, FILM COATED ORAL 2 TIMES DAILY
Qty: 6 TABLET | Refills: 0 | Status: SHIPPED | OUTPATIENT
Start: 2023-02-03 | End: 2023-02-06

## 2023-02-03 NOTE — TELEPHONE ENCOUNTER
Diagnosis Orders   1.  Urinary tract infection without hematuria, site unspecified  ciprofloxacin (CIPRO) 250 MG tablet        I eprescribed the med

## 2023-03-07 ENCOUNTER — TELEPHONE (OUTPATIENT)
Dept: UROLOGY | Age: 73
End: 2023-03-07

## 2023-03-07 DIAGNOSIS — I63.9 CEREBROVASCULAR ACCIDENT (CVA), UNSPECIFIED MECHANISM (HCC): Primary | ICD-10-CM

## 2023-03-15 ENCOUNTER — TELEPHONE (OUTPATIENT)
Dept: UROLOGY | Age: 73
End: 2023-03-15

## 2023-03-15 DIAGNOSIS — I63.9 CEREBROVASCULAR ACCIDENT (CVA), UNSPECIFIED MECHANISM (HCC): Primary | ICD-10-CM

## 2023-03-15 NOTE — TELEPHONE ENCOUNTER
Called pt per request of Dr. Sindy Hurtado. Pt had requested a referral to [facility]. We were unable to locate group. Pt states Fran Pizano is the neurologist she would like to see. Referral sent.

## 2023-04-14 ENCOUNTER — TELEPHONE (OUTPATIENT)
Dept: UROLOGY | Age: 73
End: 2023-04-14

## 2023-04-14 DIAGNOSIS — N30.01 ACUTE CYSTITIS WITH HEMATURIA: Primary | ICD-10-CM

## 2023-04-18 RX ORDER — CIPROFLOXACIN 250 MG/1
250 TABLET, FILM COATED ORAL 2 TIMES DAILY
Qty: 6 TABLET | Refills: 0 | Status: SHIPPED | OUTPATIENT
Start: 2023-04-18 | End: 2023-04-21

## 2023-04-18 NOTE — TELEPHONE ENCOUNTER
Diagnosis Orders   1.  Acute cystitis with hematuria  ciprofloxacin (CIPRO) 250 MG tablet        I eprescribed the med

## 2023-05-26 ENCOUNTER — OFFICE VISIT (OUTPATIENT)
Dept: UROLOGY | Age: 73
End: 2023-05-26
Payer: MEDICARE

## 2023-05-26 DIAGNOSIS — Z86.718 HISTORY OF DVT OF LOWER EXTREMITY: ICD-10-CM

## 2023-05-26 DIAGNOSIS — N32.81 OAB (OVERACTIVE BLADDER): ICD-10-CM

## 2023-05-26 DIAGNOSIS — N30.10 INTERSTITIAL CYSTITIS (CHRONIC) WITHOUT HEMATURIA: ICD-10-CM

## 2023-05-26 DIAGNOSIS — N39.0 URINARY TRACT INFECTION WITHOUT HEMATURIA, SITE UNSPECIFIED: ICD-10-CM

## 2023-05-26 DIAGNOSIS — I63.9 CEREBROVASCULAR ACCIDENT (CVA), UNSPECIFIED MECHANISM (HCC): ICD-10-CM

## 2023-05-26 DIAGNOSIS — N95.2 ATROPHIC VAGINITIS: ICD-10-CM

## 2023-05-26 DIAGNOSIS — N30.01 ACUTE CYSTITIS WITH HEMATURIA: Primary | ICD-10-CM

## 2023-05-26 LAB
BILIRUBIN, URINE, POC: NEGATIVE
BLOOD URINE, POC: NORMAL
GLUCOSE URINE, POC: NEGATIVE
KETONES, URINE, POC: NEGATIVE
LEUKOCYTE ESTERASE, URINE, POC: NORMAL
NITRITE, URINE, POC: NEGATIVE
PH, URINE, POC: 5.5 (ref 4.6–8)
PROTEIN,URINE, POC: 300
SPECIFIC GRAVITY, URINE, POC: 1.03 (ref 1–1.03)
URINALYSIS CLARITY, POC: NORMAL
URINALYSIS COLOR, POC: NORMAL
UROBILINOGEN, POC: NORMAL

## 2023-05-26 PROCEDURE — 81003 URINALYSIS AUTO W/O SCOPE: CPT | Performed by: UROLOGY

## 2023-05-26 PROCEDURE — 1123F ACP DISCUSS/DSCN MKR DOCD: CPT | Performed by: UROLOGY

## 2023-05-26 PROCEDURE — 3017F COLORECTAL CA SCREEN DOC REV: CPT | Performed by: UROLOGY

## 2023-05-26 PROCEDURE — 99213 OFFICE O/P EST LOW 20 MIN: CPT | Performed by: UROLOGY

## 2023-05-26 PROCEDURE — 1036F TOBACCO NON-USER: CPT | Performed by: UROLOGY

## 2023-05-26 PROCEDURE — G8427 DOCREV CUR MEDS BY ELIG CLIN: HCPCS | Performed by: UROLOGY

## 2023-05-26 PROCEDURE — G8399 PT W/DXA RESULTS DOCUMENT: HCPCS | Performed by: UROLOGY

## 2023-05-26 PROCEDURE — 1090F PRES/ABSN URINE INCON ASSESS: CPT | Performed by: UROLOGY

## 2023-05-26 PROCEDURE — G8417 CALC BMI ABV UP PARAM F/U: HCPCS | Performed by: UROLOGY

## 2023-05-26 RX ORDER — DARIFENACIN HYDROBROMIDE 15 MG/1
TABLET, EXTENDED RELEASE ORAL
Qty: 90 TABLET | Refills: 3 | Status: SHIPPED | OUTPATIENT
Start: 2023-05-26

## 2023-05-26 ASSESSMENT — ENCOUNTER SYMPTOMS
BACK PAIN: 0
NAUSEA: 0

## 2023-05-26 NOTE — PROGRESS NOTES
[facility]  [address]  [address] [facility], [address] [facility]  [phone]    Nessa Schaefer  : 1950    Chief Complaint   Patient presents with    Follow-up        HPI     Nessa Schaefer is a 74 y.o. female with hx of cystocele, eosinophilic cystitis. Patient had cystocele repair with Perigee system on 2006. Had DVT postop. Had a small area of exposed mesh in ., started on Estrace cream.    Started on Estrace vaginal cream. No exposed mesh when seen in 3-10. Cystoscopy showed a grade 2 cystocele. This appeared to be distal to the previous sling. Negative MMK test.    She continued to have incontinence as well as postvoid dribbling. On 12 she had urethrolysis, Anterior Elevate repair . Was using Estrace and Enablex. Had been on Trimethoprim suppression but got C.diff. enterocolitis. States that she got a rash with Macrodantin once, but has taken it several times We tried Macrodantin daily in  but she got a rash again . Can take Cipro without problem. Takes Enablex 15mg daily. Was on Estrace cream 3x/week, but stopped it in . Was seen in , pelvic showed possible small area of exposed mesh. Had chemotherapy for breast cancer. Also XRT. Has had recurrent UTI. Has occasional spotting. She is not bothered by this. Spotting may be from exposed vaginal mesh. Cant give estrogen cream because of recent breast cancer. She does not have any sxs , will follow for now. Continues Enablex. Had reaction to Trimethoprim. Takes Hiprex. Has had bloody vaginal spotting in  on 2 occasions . Exam in  showed atrophic vaginal mucosa , no exposed mesh. Having pain when voiding , left flank pain, occasional vaginal spotting. had pyuria by cath urine but negative culture. Still having pain with voiding and hematuria. cysto in 10-18 showed diffuse cystitis.        On 18 had cysto in OR:  Cystoscopy, hydrodistention of

## 2023-05-28 LAB
BACTERIA SPEC CULT: NORMAL
BACTERIA SPEC CULT: NORMAL
SERVICE CMNT-IMP: NORMAL

## 2023-05-30 ENCOUNTER — TELEPHONE (OUTPATIENT)
Dept: ONCOLOGY | Age: 73
End: 2023-05-30

## 2023-05-31 DIAGNOSIS — Z12.31 ENCOUNTER FOR SCREENING MAMMOGRAM FOR MALIGNANT NEOPLASM OF BREAST: ICD-10-CM

## 2023-05-31 DIAGNOSIS — Z85.3 PERSONAL HISTORY OF MALIGNANT NEOPLASM OF BREAST: Primary | ICD-10-CM

## 2023-08-10 ENCOUNTER — HOSPITAL ENCOUNTER (OUTPATIENT)
Dept: MAMMOGRAPHY | Age: 73
Discharge: HOME | End: 2023-08-10
Attending: INTERNAL MEDICINE
Payer: MEDICARE

## 2023-08-10 VITALS — BODY MASS INDEX: 25.39 KG/M2 | WEIGHT: 130 LBS

## 2023-08-10 DIAGNOSIS — Z85.3 PERSONAL HISTORY OF MALIGNANT NEOPLASM OF BREAST: ICD-10-CM

## 2023-08-10 DIAGNOSIS — Z12.31 ENCOUNTER FOR SCREENING MAMMOGRAM FOR MALIGNANT NEOPLASM OF BREAST: ICD-10-CM

## 2023-08-10 PROCEDURE — 77067 SCR MAMMO BI INCL CAD: CPT

## 2023-08-28 ENCOUNTER — HOSPITAL ENCOUNTER (OUTPATIENT)
Dept: LAB | Age: 73
Discharge: HOME | End: 2023-08-31
Payer: MEDICARE

## 2023-08-28 ENCOUNTER — OFFICE VISIT (OUTPATIENT)
Dept: ONCOLOGY | Age: 73
End: 2023-08-28
Payer: MEDICARE

## 2023-08-28 VITALS
TEMPERATURE: 98.1 F | HEART RATE: 59 BPM | SYSTOLIC BLOOD PRESSURE: 123 MMHG | BODY MASS INDEX: 27.17 KG/M2 | HEIGHT: 60 IN | RESPIRATION RATE: 16 BRPM | DIASTOLIC BLOOD PRESSURE: 61 MMHG | WEIGHT: 138.4 LBS | OXYGEN SATURATION: 99 %

## 2023-08-28 DIAGNOSIS — Z85.3 PERSONAL HISTORY OF MALIGNANT NEOPLASM OF BREAST: Primary | ICD-10-CM

## 2023-08-28 DIAGNOSIS — M81.0 AGE-RELATED OSTEOPOROSIS WITHOUT CURRENT PATHOLOGICAL FRACTURE: ICD-10-CM

## 2023-08-28 DIAGNOSIS — Z85.3 PERSONAL HISTORY OF MALIGNANT NEOPLASM OF BREAST: ICD-10-CM

## 2023-08-28 LAB
ALBUMIN SERPL-MCNC: 3.6 G/DL (ref 3.2–4.6)
ALBUMIN/GLOB SERPL: 0.9 (ref 0.4–1.6)
ALP SERPL-CCNC: 55 U/L (ref 50–136)
ALT SERPL-CCNC: 21 U/L (ref 12–65)
ANION GAP SERPL CALC-SCNC: 3 MMOL/L (ref 2–11)
AST SERPL-CCNC: 17 U/L (ref 15–37)
BASOPHILS # BLD: 0.1 K/UL (ref 0–0.2)
BASOPHILS NFR BLD: 1 % (ref 0–2)
BILIRUB SERPL-MCNC: 0.5 MG/DL (ref 0.2–1.1)
BUN SERPL-MCNC: 24 MG/DL (ref 8–23)
CALCIUM SERPL-MCNC: 9.2 MG/DL (ref 8.3–10.4)
CHLORIDE SERPL-SCNC: 112 MMOL/L (ref 101–110)
CO2 SERPL-SCNC: 27 MMOL/L (ref 21–32)
CREAT SERPL-MCNC: 1.3 MG/DL (ref 0.6–1)
DIFFERENTIAL METHOD BLD: ABNORMAL
EOSINOPHIL # BLD: 0.6 K/UL (ref 0–0.8)
EOSINOPHIL NFR BLD: 10 % (ref 0.5–7.8)
ERYTHROCYTE [DISTWIDTH] IN BLOOD BY AUTOMATED COUNT: 12.4 % (ref 11.9–14.6)
GLOBULIN SER CALC-MCNC: 4.1 G/DL (ref 2.8–4.5)
GLUCOSE SERPL-MCNC: 112 MG/DL (ref 65–100)
HCT VFR BLD AUTO: 35.8 % (ref 35.8–46.3)
HGB BLD-MCNC: 11.5 G/DL (ref 11.7–15.4)
IMM GRANULOCYTES # BLD AUTO: 0 K/UL (ref 0–0.5)
IMM GRANULOCYTES NFR BLD AUTO: 0 % (ref 0–5)
LYMPHOCYTES # BLD: 1.8 K/UL (ref 0.5–4.6)
LYMPHOCYTES NFR BLD: 26 % (ref 13–44)
MCH RBC QN AUTO: 31.3 PG (ref 26.1–32.9)
MCHC RBC AUTO-ENTMCNC: 32.1 G/DL (ref 31.4–35)
MCV RBC AUTO: 97.3 FL (ref 82–102)
MONOCYTES # BLD: 0.7 K/UL (ref 0.1–1.3)
MONOCYTES NFR BLD: 10 % (ref 4–12)
NEUTS SEG # BLD: 3.6 K/UL (ref 1.7–8.2)
NEUTS SEG NFR BLD: 54 % (ref 43–78)
NRBC # BLD: 0 K/UL (ref 0–0.2)
PLATELET # BLD AUTO: 181 K/UL (ref 150–450)
PMV BLD AUTO: 9.8 FL (ref 9.4–12.3)
POTASSIUM SERPL-SCNC: 4.1 MMOL/L (ref 3.5–5.1)
PROT SERPL-MCNC: 7.7 G/DL (ref 6.3–8.2)
RBC # BLD AUTO: 3.68 M/UL (ref 4.05–5.2)
SODIUM SERPL-SCNC: 142 MMOL/L (ref 133–143)
WBC # BLD AUTO: 6.7 K/UL (ref 4.3–11.1)

## 2023-08-28 PROCEDURE — 1090F PRES/ABSN URINE INCON ASSESS: CPT | Performed by: INTERNAL MEDICINE

## 2023-08-28 PROCEDURE — 3078F DIAST BP <80 MM HG: CPT | Performed by: INTERNAL MEDICINE

## 2023-08-28 PROCEDURE — 80053 COMPREHEN METABOLIC PANEL: CPT

## 2023-08-28 PROCEDURE — 99214 OFFICE O/P EST MOD 30 MIN: CPT | Performed by: INTERNAL MEDICINE

## 2023-08-28 PROCEDURE — 3074F SYST BP LT 130 MM HG: CPT | Performed by: INTERNAL MEDICINE

## 2023-08-28 PROCEDURE — 1123F ACP DISCUSS/DSCN MKR DOCD: CPT | Performed by: INTERNAL MEDICINE

## 2023-08-28 PROCEDURE — G8399 PT W/DXA RESULTS DOCUMENT: HCPCS | Performed by: INTERNAL MEDICINE

## 2023-08-28 PROCEDURE — G8417 CALC BMI ABV UP PARAM F/U: HCPCS | Performed by: INTERNAL MEDICINE

## 2023-08-28 PROCEDURE — G8427 DOCREV CUR MEDS BY ELIG CLIN: HCPCS | Performed by: INTERNAL MEDICINE

## 2023-08-28 PROCEDURE — 36415 COLL VENOUS BLD VENIPUNCTURE: CPT

## 2023-08-28 PROCEDURE — 3017F COLORECTAL CA SCREEN DOC REV: CPT | Performed by: INTERNAL MEDICINE

## 2023-08-28 PROCEDURE — 1036F TOBACCO NON-USER: CPT | Performed by: INTERNAL MEDICINE

## 2023-08-28 PROCEDURE — 85025 COMPLETE CBC W/AUTO DIFF WBC: CPT

## 2023-08-28 RX ORDER — SODIUM CHLORIDE 0.9 % (FLUSH) 0.9 %
5-40 SYRINGE (ML) INJECTION PRN
OUTPATIENT
Start: 2023-08-28

## 2023-08-28 RX ORDER — SODIUM CHLORIDE 9 MG/ML
INJECTION, SOLUTION INTRAVENOUS CONTINUOUS
OUTPATIENT
Start: 2023-08-28

## 2023-08-28 RX ORDER — ALBUTEROL SULFATE 90 UG/1
4 AEROSOL, METERED RESPIRATORY (INHALATION) PRN
OUTPATIENT
Start: 2023-08-28

## 2023-08-28 RX ORDER — DIPHENHYDRAMINE HYDROCHLORIDE 50 MG/ML
50 INJECTION INTRAMUSCULAR; INTRAVENOUS
OUTPATIENT
Start: 2023-08-28

## 2023-08-28 RX ORDER — FAMOTIDINE 10 MG/ML
20 INJECTION, SOLUTION INTRAVENOUS
OUTPATIENT
Start: 2023-08-28

## 2023-08-28 RX ORDER — SODIUM CHLORIDE 9 MG/ML
5-250 INJECTION, SOLUTION INTRAVENOUS PRN
OUTPATIENT
Start: 2023-08-28

## 2023-08-28 RX ORDER — ACETAMINOPHEN 325 MG/1
650 TABLET ORAL
OUTPATIENT
Start: 2023-08-28

## 2023-08-28 RX ORDER — EPINEPHRINE 1 MG/ML
0.3 INJECTION, SOLUTION, CONCENTRATE INTRAVENOUS PRN
OUTPATIENT
Start: 2023-08-28

## 2023-08-28 RX ORDER — HEPARIN SODIUM (PORCINE) LOCK FLUSH IV SOLN 100 UNIT/ML 100 UNIT/ML
500 SOLUTION INTRAVENOUS PRN
OUTPATIENT
Start: 2023-08-28

## 2023-08-28 RX ORDER — ZOLEDRONIC ACID 5 MG/100ML
5 INJECTION, SOLUTION INTRAVENOUS ONCE
Start: 2023-08-28 | End: 2023-08-28

## 2023-08-28 RX ORDER — ONDANSETRON 2 MG/ML
8 INJECTION INTRAMUSCULAR; INTRAVENOUS
OUTPATIENT
Start: 2023-08-28

## 2023-08-28 ASSESSMENT — PATIENT HEALTH QUESTIONNAIRE - PHQ9
2. FEELING DOWN, DEPRESSED OR HOPELESS: 0
1. LITTLE INTEREST OR PLEASURE IN DOING THINGS: 0
SUM OF ALL RESPONSES TO PHQ9 QUESTIONS 1 & 2: 0
SUM OF ALL RESPONSES TO PHQ QUESTIONS 1-9: 0

## 2023-08-28 NOTE — PATIENT INSTRUCTIONS
Patient Instructions from Today's Visit    Reason for Visit:  Follow-up visit for breast cancer    Diagnosis Information:  [email or web address]/. net/about-us/asco-answers-patient-education-materials/rmez-cqvqjjr-lonk-sheets    Plan:  -DEXA scan in one year before your next appointment with Dr. Val Busby call [phone] to schedule DEXA scan.  -Continue to follow up with PCP. Follow Up:  -1 year with Dr. Val Busby after DEXA scan.     Recent Lab Results:  Hospital Outpatient Visit on 08/28/2023   Component Date Value Ref Range Status    WBC 08/28/2023 6.7  4.3 - 11.1 K/uL Final    RBC 08/28/2023 3.68 (L)  4.05 - 5.2 M/uL Final    Hemoglobin 08/28/2023 11.5 (L)  11.7 - 15.4 g/dL Final    Hematocrit 08/28/2023 35.8  35.8 - 46.3 % Final    MCV 08/28/2023 97.3  82.0 - 102.0 FL Final    MCH 08/28/2023 31.3  26.1 - 32.9 PG Final    MCHC 08/28/2023 32.1  31.4 - 35.0 g/dL Final    RDW 08/28/2023 12.4  11.9 - 14.6 % Final    Platelets 99/71/9946 181  150 - 450 K/uL Final    MPV 08/28/2023 9.8  9.4 - 12.3 FL Final    nRBC 08/28/2023 0.00  0.0 - 0.2 K/uL Final    **Note: Absolute NRBC parameter is now reported with Hemogram**    Differential Type 08/28/2023 AUTOMATED    Final    Neutrophils % 08/28/2023 54  43 - 78 % Final    Lymphocytes % 08/28/2023 26  13 - 44 % Final    Monocytes % 08/28/2023 10  4.0 - 12.0 % Final    Eosinophils % 08/28/2023 10 (H)  0.5 - 7.8 % Final    Basophils % 08/28/2023 1  0.0 - 2.0 % Final    Immature Granulocytes 08/28/2023 0  0.0 - 5.0 % Final    Neutrophils Absolute 08/28/2023 3.6  1.7 - 8.2 K/UL Final    Lymphocytes Absolute 08/28/2023 1.8  0.5 - 4.6 K/UL Final    Monocytes Absolute 08/28/2023 0.7  0.1 - 1.3 K/UL Final    Eosinophils Absolute 08/28/2023 0.6  0.0 - 0.8 K/UL Final    Basophils Absolute 08/28/2023 0.1  0.0 - 0.2 K/UL Final    Absolute Immature Granulocyte 08/28/2023 0.0  0.0 - 0.5 K/UL Final    Sodium 08/28/2023 142  133 - 143 mmol/L Final    Potassium 08/28/2023 4.1  3.5 - 5.1 mmol/L

## 2023-08-28 NOTE — PROGRESS NOTES
for hormonal therapy for her strong ER+ with aromasin and should be compatible with her DVT history, the need for adjuvant right breat XRT. We also discussed chemotherapy  is not strongly indicated in her case unless the HER2 FISH repeat is positive, and she was open the options, will have mammaprint symphony for both ER/MD and HER2 pathway analysis and the risk evaluation. Her right breast rash is probably due to unknown  allergy, used cortisone cream and resolved. Her repeat HER2 FISH turned out amplified. We discussed that was a strong indication for chemotherapy and she received it well, plan to have port placement and TCH x6 after her trip to [place], followed by XRT, 52 weeks of Herceptin, 5 years of AI. Exam  showed a left axilla LN, marked and send for US eval showed benign appearance. TargetPrint added ambiguity of the HER2 pathway activity and we discussed again, and she would rather proceed with chemotherapy to maximize her change of survival.      She returned and felt well, port placed for 11/4, started cycle 1 [facility] 11/6/14, had mucositis and dehydration received IVF, start MMW, instruct for Ensure and volume intake, neutropenic precaution educated, Cycle 2 was better tolerated, neutropenia but  ok to proceed with herceptin. Proceed to cycle 3 TCH, skipped day 8 for her Xmas travel, compenstate one dose of missing herceptin when return. She had a short course of diarrhea probably due to viral infection. She finished cycle 6 without complication,  but skipped the weekly herceptin probably due to miscommunication, proceeded to q3week herceptin and gave a loading dose to compensate the missing dose. She started XRT from 3/10/15. Discussed and started  aromasin from 3/2015, tolerated well, reported mild weakness and wobbling referred to onc rehab. Finished XRT 4/22/16,  Continue aromasin and herceptin q3wk, but given her planned traveling, made it  [id number] for next two doses of 8mg/m2.  She had done well

## 2023-09-04 ENCOUNTER — HOSPITAL ENCOUNTER (OUTPATIENT)
Dept: INFUSION THERAPY | Age: 73
Discharge: HOME | End: 2023-09-04
Payer: MEDICARE

## 2023-09-04 VITALS
BODY MASS INDEX: 26.17 KG/M2 | TEMPERATURE: 98.3 F | WEIGHT: 134 LBS | SYSTOLIC BLOOD PRESSURE: 115 MMHG | RESPIRATION RATE: 16 BRPM | DIASTOLIC BLOOD PRESSURE: 75 MMHG | OXYGEN SATURATION: 97 % | HEART RATE: 83 BPM

## 2023-09-04 DIAGNOSIS — M81.0 OSTEOPOROSIS WITHOUT CURRENT PATHOLOGICAL FRACTURE, UNSPECIFIED OSTEOPOROSIS TYPE: Primary | ICD-10-CM

## 2023-09-04 PROCEDURE — 6360000002 HC RX W HCPCS: Performed by: INTERNAL MEDICINE

## 2023-09-04 PROCEDURE — 96365 THER/PROPH/DIAG IV INF INIT: CPT

## 2023-09-04 PROCEDURE — 2580000003 HC RX 258: Performed by: INTERNAL MEDICINE

## 2023-09-04 RX ORDER — ACETAMINOPHEN 325 MG/1
650 TABLET ORAL
Status: DISCONTINUED | OUTPATIENT
Start: 2023-09-04 | End: 2023-09-05 | Stop reason: HOSPADM

## 2023-09-04 RX ORDER — ZOLEDRONIC ACID 5 MG/100ML
5 INJECTION, SOLUTION INTRAVENOUS ONCE
Start: 2023-09-04 | End: 2023-09-04

## 2023-09-04 RX ORDER — ONDANSETRON 2 MG/ML
8 INJECTION INTRAMUSCULAR; INTRAVENOUS
OUTPATIENT
Start: 2023-09-04

## 2023-09-04 RX ORDER — SODIUM CHLORIDE 9 MG/ML
5-250 INJECTION, SOLUTION INTRAVENOUS PRN
OUTPATIENT
Start: 2023-09-04

## 2023-09-04 RX ORDER — DIPHENHYDRAMINE HYDROCHLORIDE 50 MG/ML
50 INJECTION INTRAMUSCULAR; INTRAVENOUS
OUTPATIENT
Start: 2023-09-04

## 2023-09-04 RX ORDER — ALBUTEROL SULFATE 90 UG/1
4 AEROSOL, METERED RESPIRATORY (INHALATION) PRN
Status: DISCONTINUED | OUTPATIENT
Start: 2023-09-04 | End: 2023-09-05 | Stop reason: HOSPADM

## 2023-09-04 RX ORDER — SODIUM CHLORIDE 9 MG/ML
5-250 INJECTION, SOLUTION INTRAVENOUS PRN
Status: DISCONTINUED | OUTPATIENT
Start: 2023-09-04 | End: 2023-09-05 | Stop reason: HOSPADM

## 2023-09-04 RX ORDER — SODIUM CHLORIDE 0.9 % (FLUSH) 0.9 %
5-40 SYRINGE (ML) INJECTION PRN
Status: DISCONTINUED | OUTPATIENT
Start: 2023-09-04 | End: 2023-09-05 | Stop reason: HOSPADM

## 2023-09-04 RX ORDER — EPINEPHRINE 1 MG/ML
0.3 INJECTION, SOLUTION, CONCENTRATE INTRAVENOUS PRN
Status: DISCONTINUED | OUTPATIENT
Start: 2023-09-04 | End: 2023-09-05 | Stop reason: HOSPADM

## 2023-09-04 RX ORDER — SODIUM CHLORIDE 0.9 % (FLUSH) 0.9 %
5-40 SYRINGE (ML) INJECTION PRN
OUTPATIENT
Start: 2023-09-04

## 2023-09-04 RX ORDER — EPINEPHRINE 1 MG/ML
0.3 INJECTION, SOLUTION, CONCENTRATE INTRAVENOUS PRN
OUTPATIENT
Start: 2023-09-04

## 2023-09-04 RX ORDER — ALBUTEROL SULFATE 90 UG/1
4 AEROSOL, METERED RESPIRATORY (INHALATION) PRN
OUTPATIENT
Start: 2023-09-04

## 2023-09-04 RX ORDER — ONDANSETRON 2 MG/ML
8 INJECTION INTRAMUSCULAR; INTRAVENOUS
Status: DISCONTINUED | OUTPATIENT
Start: 2023-09-04 | End: 2023-09-05 | Stop reason: HOSPADM

## 2023-09-04 RX ORDER — ZOLEDRONIC ACID 5 MG/100ML
5 INJECTION, SOLUTION INTRAVENOUS ONCE
Status: COMPLETED | OUTPATIENT
Start: 2023-09-04 | End: 2023-09-04

## 2023-09-04 RX ORDER — ACETAMINOPHEN 325 MG/1
650 TABLET ORAL
OUTPATIENT
Start: 2023-09-04

## 2023-09-04 RX ORDER — HEPARIN 100 UNIT/ML
500 SYRINGE INTRAVENOUS PRN
OUTPATIENT
Start: 2023-09-04

## 2023-09-04 RX ORDER — SODIUM CHLORIDE 9 MG/ML
INJECTION, SOLUTION INTRAVENOUS CONTINUOUS
OUTPATIENT
Start: 2023-09-04

## 2023-09-04 RX ORDER — DIPHENHYDRAMINE HYDROCHLORIDE 50 MG/ML
50 INJECTION INTRAMUSCULAR; INTRAVENOUS
Status: DISCONTINUED | OUTPATIENT
Start: 2023-09-04 | End: 2023-09-05 | Stop reason: HOSPADM

## 2023-09-04 RX ADMIN — ZOLEDRONIC ACID 5 MG: 0.05 INJECTION, SOLUTION INTRAVENOUS at 13:24

## 2023-09-04 RX ADMIN — SODIUM CHLORIDE, PRESERVATIVE FREE 10 ML: 5 INJECTION INTRAVENOUS at 13:24

## 2023-09-04 RX ADMIN — SODIUM CHLORIDE, PRESERVATIVE FREE 10 ML: 5 INJECTION INTRAVENOUS at 13:54

## 2023-09-04 NOTE — PROGRESS NOTES
Arrived to the [address] [place] [address]. [facility]. Reclast completed. Patient tolerated well. Any issues or concerns during appointment: none. Patient instructed to call provider with temperature of 100.4 or greater or nausea/vomiting/ diarrhea or pain not controlled by medications  Discharged ambulatory.

## 2023-09-05 ENCOUNTER — OFFICE VISIT (OUTPATIENT)
Age: 73
End: 2023-09-05
Payer: MEDICARE

## 2023-09-05 VITALS
DIASTOLIC BLOOD PRESSURE: 58 MMHG | SYSTOLIC BLOOD PRESSURE: 128 MMHG | WEIGHT: 138 LBS | BODY MASS INDEX: 27.09 KG/M2 | HEART RATE: 84 BPM | HEIGHT: 60 IN

## 2023-09-05 DIAGNOSIS — Z86.718 HX OF DEEP VENOUS THROMBOSIS: ICD-10-CM

## 2023-09-05 DIAGNOSIS — Z86.79 HISTORY OF CONGESTIVE HEART FAILURE: ICD-10-CM

## 2023-09-05 DIAGNOSIS — I10 PRIMARY HYPERTENSION: Primary | ICD-10-CM

## 2023-09-05 PROCEDURE — 3078F DIAST BP <80 MM HG: CPT | Performed by: INTERNAL MEDICINE

## 2023-09-05 PROCEDURE — G8428 CUR MEDS NOT DOCUMENT: HCPCS | Performed by: INTERNAL MEDICINE

## 2023-09-05 PROCEDURE — 99214 OFFICE O/P EST MOD 30 MIN: CPT | Performed by: INTERNAL MEDICINE

## 2023-09-05 PROCEDURE — G8399 PT W/DXA RESULTS DOCUMENT: HCPCS | Performed by: INTERNAL MEDICINE

## 2023-09-05 PROCEDURE — 1036F TOBACCO NON-USER: CPT | Performed by: INTERNAL MEDICINE

## 2023-09-05 PROCEDURE — 3074F SYST BP LT 130 MM HG: CPT | Performed by: INTERNAL MEDICINE

## 2023-09-05 PROCEDURE — 1090F PRES/ABSN URINE INCON ASSESS: CPT | Performed by: INTERNAL MEDICINE

## 2023-09-05 PROCEDURE — G8417 CALC BMI ABV UP PARAM F/U: HCPCS | Performed by: INTERNAL MEDICINE

## 2023-09-05 PROCEDURE — 1123F ACP DISCUSS/DSCN MKR DOCD: CPT | Performed by: INTERNAL MEDICINE

## 2023-09-05 PROCEDURE — 3017F COLORECTAL CA SCREEN DOC REV: CPT | Performed by: INTERNAL MEDICINE

## 2023-09-05 RX ORDER — METOPROLOL SUCCINATE 25 MG/1
25 TABLET, EXTENDED RELEASE ORAL DAILY
Qty: 90 TABLET | Refills: 3 | Status: SHIPPED | OUTPATIENT
Start: 2023-09-05

## 2023-09-05 RX ORDER — RIVAROXABAN 10 MG/1
10 TABLET, FILM COATED ORAL
Qty: 90 TABLET | Refills: 3 | Status: SHIPPED | OUTPATIENT
Start: 2023-09-05

## 2023-09-05 ASSESSMENT — ENCOUNTER SYMPTOMS
NAIL CHANGES: 0
COUGH: 0
ABDOMINAL PAIN: 0
APHONIA: 0
STRIDOR: 0
EYE PAIN: 0

## 2023-09-05 NOTE — PROGRESS NOTES
[address] [place]  [address] [facility], [facility], [address]  PHONE: [phone]    SUBJECTIVE:   Domenico Linder is a 74 y.o. female 1950   seen for a follow up visit regarding the following:     Chief Complaint   Patient presents with    Hypertension         History of present illness: 74 y.o. female presented for follow-up 9/5/23 Plans to stay in [place]. Interval Hx    Previous history of cardiomyopathy, which resolved, as well as elevated pulmonary pressures previously detected on echocardiogram.        She was originally seen in 07/2015 with diminished left ventricular systolic function on echocardiogram.  She underwent left and right heart catheterization angiogram, which revealed no obstructive coronary disease. Pulmonary pressures were of concern  previously, but not profoundly elevated based on right heart catheterization numbers. Cardiac History:     History of DVT on full dose aspirin. Echo 2015 EF 40-45%    Cardiac catheterization in 08/2015 with angiographically normal coronary arteries. Right heart catheterization was done due to her history of elevated right ventricular systolic pressures. Right heart catheterization showed right atrial pressure of  9, RV 35/80, PA 33/14, mean PA pressure 21 mmHg, A wave of 12, V wave of 15, pulmonary capillary wedge pressure 11, cardiac output 4.2 meters/minute, cardiac index 2.16 liters/minute by Traci. The patient's  passed away unexpectedly from esophageal cancer in 10/2015. Echocardiogram 03/2017 - normal left ventricular systolic function, normal RVSP.      5/2016 CT with findings concerning for early pulmonary fibrosis     10/2017 xray reported as normal     6/2018 echo normal EF    6/2018 CT chest no change     12/11/20 Sinus  Rhythm Low voltage in precordial leads.     11/4/20201 sinus rhythm, normal rate, normal NE intervals, ST wave normal, normal axis,  12/7/2023 Sinus  Rhythm WITHIN NORMAL

## 2023-09-21 ENCOUNTER — TELEPHONE (OUTPATIENT)
Dept: UROLOGY | Age: 73
End: 2023-09-21

## 2023-09-21 NOTE — TELEPHONE ENCOUNTER
Left message for pt to call back and reschedule her appointment. Her alternate number didn't work.  Cancelling appointment for now

## 2023-12-01 ENCOUNTER — OFFICE VISIT (OUTPATIENT)
Dept: UROLOGY | Age: 73
End: 2023-12-01

## 2023-12-01 DIAGNOSIS — N39.0 URINARY TRACT INFECTION WITHOUT HEMATURIA, SITE UNSPECIFIED: ICD-10-CM

## 2023-12-01 DIAGNOSIS — Z86.718 HISTORY OF DVT OF LOWER EXTREMITY: ICD-10-CM

## 2023-12-01 DIAGNOSIS — N30.01 ACUTE CYSTITIS WITH HEMATURIA: Primary | ICD-10-CM

## 2023-12-01 DIAGNOSIS — N95.2 ATROPHIC VAGINITIS: ICD-10-CM

## 2023-12-01 ASSESSMENT — ENCOUNTER SYMPTOMS
NAUSEA: 0
BACK PAIN: 0

## 2023-12-01 NOTE — PROGRESS NOTES
treated with elevation\"  \"no meds\"    Thromboembolus ([address]) 2006    right leg s/p cystocele (tx coumadin x 6 months    Urge incontinence     Urinary tract infection, site not specified     UTI (urinary tract infection)      Past Surgical History:   Procedure Laterality Date    BREAST BIOPSY Right 7/14    BREAST BIOPSY Left 1994    surgical    BREAST LUMPECTOMY  8/15/2014    RIGHT BREAST NEEDLE LOCALIZED LUMPECTOMY    PREOP @ 10:00 / NUC MED 11:45 / NEEDLE LOC 2:00 / SURGERY @ 4:30  performed by Alia Maciel MD at [address] [place] [address]    breast benign lumpectomy    HERNIA REPAIR Right 2002    ventral    HYSTERECTOMY (CERVIX STATUS UNKNOWN)  1973    vaginal    OTHER SURGICAL HISTORY      PORT PLACEMENT    UROLOGICAL SURGERY  2006    cystocele repair    UROLOGICAL SURGERY  2000    cystocele repair    UROLOGICAL SURGERY  2005    cystocele and rectocele repair    UROLOGICAL SURGERY      vaginal sling urethropexy    UROLOGICAL SURGERY  2001    adhesions and nerve pain due to cystocele    VASCULAR SURGERY       Current Outpatient Medications   Medication Sig Dispense Refill    metoprolol succinate (TOPROL XL) 25 MG extended release tablet Take 1 tablet by mouth daily 90 tablet 3    rivaroxaban (XARELTO) 10 MG TABS tablet Take 1 tablet by mouth daily (with breakfast) 90 tablet 3    darifenacin (ENABLEX) 15 MG extended release tablet TAKE 1 TABLET DAILY 90 tablet 3    PARoxetine (PAXIL) 10 MG/5ML suspension Take by mouth every morning      Biotin 2.5 MG TABS Take 5,000 mcg by mouth daily      vitamin D 25 MCG (1000 UT) CAPS Take 5 capsules by mouth 2 times daily      colesevelam (WELCHOL) 625 MG tablet Take 3 tablets by mouth 2 times daily (with meals)      fexofenadine (ALLEGRA) 180 MG tablet Take by mouth      metFORMIN (GLUCOPHAGE-XR) 500 MG extended release tablet Take by mouth Daily with supper       No current facility-administered medications for this visit.      Allergies   Allergen Reactions

## 2023-12-03 LAB
BACTERIA SPEC CULT: ABNORMAL
BACTERIA SPEC CULT: ABNORMAL
SERVICE CMNT-IMP: ABNORMAL

## 2023-12-05 ENCOUNTER — TELEPHONE (OUTPATIENT)
Dept: UROLOGY | Age: 73
End: 2023-12-05

## 2023-12-05 DIAGNOSIS — N30.01 ACUTE CYSTITIS WITH HEMATURIA: Primary | ICD-10-CM

## 2023-12-05 LAB
BACTERIA SPEC CULT: ABNORMAL
BACTERIA SPEC CULT: ABNORMAL
SERVICE CMNT-IMP: ABNORMAL

## 2023-12-05 RX ORDER — CIPROFLOXACIN 250 MG/1
250 TABLET, FILM COATED ORAL 2 TIMES DAILY
Qty: 6 TABLET | Refills: 0 | Status: SHIPPED | OUTPATIENT
Start: 2023-12-05 | End: 2023-12-08

## 2023-12-05 NOTE — TELEPHONE ENCOUNTER
Has UTI   Diagnosis Orders   1. Acute cystitis with hematuria  ciprofloxacin (CIPRO) 250 MG tablet        I eprescribed the med    
yellow

## 2024-03-04 ENCOUNTER — OFFICE VISIT (OUTPATIENT)
Dept: UROLOGY | Age: 74
End: 2024-03-04
Payer: MEDICARE

## 2024-03-04 DIAGNOSIS — N39.0 URINARY TRACT INFECTION WITHOUT HEMATURIA, SITE UNSPECIFIED: Primary | ICD-10-CM

## 2024-03-04 DIAGNOSIS — N30.10 INTERSTITIAL CYSTITIS (CHRONIC) WITHOUT HEMATURIA: ICD-10-CM

## 2024-03-04 DIAGNOSIS — N95.2 ATROPHIC VAGINITIS: ICD-10-CM

## 2024-03-04 DIAGNOSIS — N30.00 ACUTE CYSTITIS WITHOUT HEMATURIA: ICD-10-CM

## 2024-03-04 LAB
BILIRUBIN, URINE, POC: NEGATIVE
BLOOD URINE, POC: NORMAL
GLUCOSE URINE, POC: NEGATIVE
KETONES, URINE, POC: NEGATIVE
LEUKOCYTE ESTERASE, URINE, POC: NORMAL
NITRITE, URINE, POC: POSITIVE
PH, URINE, POC: 6 (ref 4.6–8)
PROTEIN,URINE, POC: NORMAL
SPECIFIC GRAVITY, URINE, POC: 1.02 (ref 1–1.03)
URINALYSIS CLARITY, POC: NORMAL
URINALYSIS COLOR, POC: NORMAL
UROBILINOGEN, POC: NORMAL

## 2024-03-04 PROCEDURE — 3017F COLORECTAL CA SCREEN DOC REV: CPT | Performed by: UROLOGY

## 2024-03-04 PROCEDURE — 99213 OFFICE O/P EST LOW 20 MIN: CPT | Performed by: UROLOGY

## 2024-03-04 PROCEDURE — G8417 CALC BMI ABV UP PARAM F/U: HCPCS | Performed by: UROLOGY

## 2024-03-04 PROCEDURE — 81003 URINALYSIS AUTO W/O SCOPE: CPT | Performed by: UROLOGY

## 2024-03-04 PROCEDURE — 1123F ACP DISCUSS/DSCN MKR DOCD: CPT | Performed by: UROLOGY

## 2024-03-04 PROCEDURE — G8399 PT W/DXA RESULTS DOCUMENT: HCPCS | Performed by: UROLOGY

## 2024-03-04 PROCEDURE — 1036F TOBACCO NON-USER: CPT | Performed by: UROLOGY

## 2024-03-04 PROCEDURE — G8484 FLU IMMUNIZE NO ADMIN: HCPCS | Performed by: UROLOGY

## 2024-03-04 PROCEDURE — 1090F PRES/ABSN URINE INCON ASSESS: CPT | Performed by: UROLOGY

## 2024-03-04 PROCEDURE — G8427 DOCREV CUR MEDS BY ELIG CLIN: HCPCS | Performed by: UROLOGY

## 2024-03-04 RX ORDER — METHENAMINE HIPPURATE 1000 MG/1
1 TABLET ORAL 2 TIMES DAILY WITH MEALS
Qty: 60 TABLET | Refills: 12 | Status: SHIPPED | OUTPATIENT
Start: 2024-03-04

## 2024-03-04 ASSESSMENT — ENCOUNTER SYMPTOMS
BACK PAIN: 0
NAUSEA: 0

## 2024-03-08 ENCOUNTER — OFFICE VISIT (OUTPATIENT)
Age: 74
End: 2024-03-08
Payer: MEDICARE

## 2024-03-08 VITALS
SYSTOLIC BLOOD PRESSURE: 138 MMHG | BODY MASS INDEX: 27.48 KG/M2 | DIASTOLIC BLOOD PRESSURE: 88 MMHG | HEIGHT: 60 IN | HEART RATE: 66 BPM | WEIGHT: 140 LBS

## 2024-03-08 DIAGNOSIS — E78.5 HYPERLIPIDEMIA LDL GOAL <70: ICD-10-CM

## 2024-03-08 DIAGNOSIS — I10 PRIMARY HYPERTENSION: Primary | ICD-10-CM

## 2024-03-08 DIAGNOSIS — I42.7 CARDIOMYOPATHY DUE TO DRUG AND EXTERNAL AGENT (HCC): ICD-10-CM

## 2024-03-08 DIAGNOSIS — Z86.718 HX OF DEEP VENOUS THROMBOSIS: ICD-10-CM

## 2024-03-08 PROCEDURE — G8417 CALC BMI ABV UP PARAM F/U: HCPCS | Performed by: INTERNAL MEDICINE

## 2024-03-08 PROCEDURE — G8484 FLU IMMUNIZE NO ADMIN: HCPCS | Performed by: INTERNAL MEDICINE

## 2024-03-08 PROCEDURE — 3017F COLORECTAL CA SCREEN DOC REV: CPT | Performed by: INTERNAL MEDICINE

## 2024-03-08 PROCEDURE — 93000 ELECTROCARDIOGRAM COMPLETE: CPT | Performed by: INTERNAL MEDICINE

## 2024-03-08 PROCEDURE — 1123F ACP DISCUSS/DSCN MKR DOCD: CPT | Performed by: INTERNAL MEDICINE

## 2024-03-08 PROCEDURE — 99214 OFFICE O/P EST MOD 30 MIN: CPT | Performed by: INTERNAL MEDICINE

## 2024-03-08 PROCEDURE — G8399 PT W/DXA RESULTS DOCUMENT: HCPCS | Performed by: INTERNAL MEDICINE

## 2024-03-08 PROCEDURE — 3079F DIAST BP 80-89 MM HG: CPT | Performed by: INTERNAL MEDICINE

## 2024-03-08 PROCEDURE — 3075F SYST BP GE 130 - 139MM HG: CPT | Performed by: INTERNAL MEDICINE

## 2024-03-08 PROCEDURE — 1036F TOBACCO NON-USER: CPT | Performed by: INTERNAL MEDICINE

## 2024-03-08 PROCEDURE — G8427 DOCREV CUR MEDS BY ELIG CLIN: HCPCS | Performed by: INTERNAL MEDICINE

## 2024-03-08 PROCEDURE — 1090F PRES/ABSN URINE INCON ASSESS: CPT | Performed by: INTERNAL MEDICINE

## 2024-03-08 RX ORDER — METOPROLOL SUCCINATE 25 MG/1
25 TABLET, EXTENDED RELEASE ORAL DAILY
Qty: 90 TABLET | Refills: 3 | Status: SHIPPED | OUTPATIENT
Start: 2024-03-08

## 2024-03-08 RX ORDER — RIVAROXABAN 10 MG/1
10 TABLET, FILM COATED ORAL
Qty: 90 TABLET | Refills: 3 | Status: SHIPPED | OUTPATIENT
Start: 2024-03-08

## 2024-03-08 ASSESSMENT — ENCOUNTER SYMPTOMS
STRIDOR: 0
APHONIA: 0
EYE PAIN: 0
ABDOMINAL PAIN: 0
COUGH: 0
NAIL CHANGES: 0

## 2024-03-08 NOTE — PROGRESS NOTES
[facility], [address], [address]  [place] [address]  PHONE: [phone]    SUBJECTIVE:   Lauren Thomas is a 73 y.o. female 1950   seen for a follow up visit regarding the following:     Chief Complaint   Patient presents with    Hypertension         History of present illness: 73 y.o. female presented for follow-up 3/8/24 No issues in the last several monht.       Interval Hx    Previous history of cardiomyopathy, which resolved, as well as elevated pulmonary pressures previously detected on echocardiogram.        She was originally seen in 07/2015 with diminished left ventricular systolic function on echocardiogram.  She underwent left and right heart catheterization angiogram, which revealed no obstructive coronary disease.  Pulmonary pressures were of concern  previously, but not profoundly elevated based on right heart catheterization numbers.        Cardiac History:     History of DVT on full dose aspirin.     Echo 2015 EF 40-45%    Cardiac catheterization in 08/2015 with angiographically normal coronary arteries.  Right heart catheterization was done due to her history of elevated right ventricular systolic pressures.  Right heart catheterization showed right atrial pressure of  9, RV 35/80, PA 33/14, mean PA pressure 21 mmHg, A wave of 12, V wave of 15, pulmonary capillary wedge pressure 11, cardiac output 4.2 meters/minute, cardiac index 2.16 liters/minute by Traci.      The patient's  passed away unexpectedly from esophageal cancer in 10/2015.    Echocardiogram 03/2017 - normal left ventricular systolic function, normal RVSP.      5/2016 CT with findings concerning for early pulmonary fibrosis     10/2017 xray reported as normal     6/2018 echo normal EF    6/2018 CT chest no change     12/11/20 Sinus  Rhythm Low voltage in precordial leads.    11/4/20201 sinus rhythm, normal rate, normal [place] intervals, ST wave normal, normal axis,  12/7/2023 Sinus  Rhythm WITHIN

## 2024-04-26 ENCOUNTER — TELEPHONE (OUTPATIENT)
Dept: UROLOGY | Age: 74
End: 2024-04-26

## 2024-04-26 NOTE — TELEPHONE ENCOUNTER
----- Message from Carlo Bullard MD sent at 4/4/2024  6:07 PM EDT -----  Low dose option eg Vagifem 10mcg is reasonable with a discussion, conventional cream is not recommended.  ----- Message -----  From: Hunter Montoya Jr., MD  Sent: 4/4/2024  12:50 PM EDT  To: Carlo Bullard MD    Hi, Geoff. You are seeing this pt with hx of breast cancer. She has severe local symptoms from vaginal atrophy. Do you think think she could receive vaginal estrogen cream safely?  Thanks, Jerardo Motnoya

## 2024-05-20 DIAGNOSIS — N32.81 OAB (OVERACTIVE BLADDER): ICD-10-CM

## 2024-05-20 RX ORDER — DARIFENACIN 15 MG/1
TABLET, EXTENDED RELEASE ORAL
Qty: 90 TABLET | Refills: 3 | Status: SHIPPED | OUTPATIENT
Start: 2024-05-20

## 2024-06-11 ENCOUNTER — TELEPHONE (OUTPATIENT)
Dept: ONCOLOGY | Age: 74
End: 2024-06-11

## 2024-06-11 ENCOUNTER — TELEPHONE (OUTPATIENT)
Dept: UROLOGY | Age: 74
End: 2024-06-11

## 2024-06-11 DIAGNOSIS — C50.311 MALIGNANT NEOPLASM OF LOWER-INNER QUADRANT OF RIGHT BREAST OF FEMALE, ESTROGEN RECEPTOR POSITIVE (HCC): Primary | ICD-10-CM

## 2024-06-11 DIAGNOSIS — Z17.0 MALIGNANT NEOPLASM OF LOWER-INNER QUADRANT OF RIGHT BREAST OF FEMALE, ESTROGEN RECEPTOR POSITIVE (HCC): Primary | ICD-10-CM

## 2024-06-11 DIAGNOSIS — N64.4 BREAST PAIN, RIGHT: ICD-10-CM

## 2024-06-11 NOTE — TELEPHONE ENCOUNTER
Physician provider: Dr. Bullard  Reason for today's call (Please detail here patients chief complaint): Pressure in right breast  Last office visit:N/A  Preferred pharmacy (If refill request): N/A  Calls to office within the last 48 hours?:No    Patient notified that their information will be routed to the [facility] clinical triage team for review. Patient is advised that they will receive a phone call from the triage department. If symptom related and symptoms worsen before receiving a call back, the patient has been advised to proceed to the nearest ED.      Pt called in complaining of pressure in right breast when she lays down on her back, as though someone is pressing on it.  Has been going on for about 3 weeks.  Pt does not have other symptoms

## 2024-06-11 NOTE — TELEPHONE ENCOUNTER
Subjective    Chief Complaint: pressure right breast    Details of Complaint: been having pressure in her right breast for the last 3 weeks or so. States she really notices when lying down. Feels like someone pushing on her breast. Denies any lumps palpated, but states that when she was diagnosed she couldn't feel any lumps then states had lumpectomy on this side about 10 years ago and that's when they found cancer. She has been doing yearly mammograms in August, but feels like we should do this sooner rather than later      Objective    Date of last Office Visit: 2023   Date of last labs: 2023   Date of last imagin23- last mammogram  Date of last treatment, if applicable:na      Plan/Intervention    Proposed Plan: do we order a diagnostic mammo and US for the right, or diagnostic bilateral with right breast US  Patient verbalized understanding of plan: YES/NO: Yes  Patient voiced intended compliance with plan: Verbalized/ Refused: Verbalized intended compliance

## 2024-06-11 NOTE — TELEPHONE ENCOUNTER
Left vm with patient, advised 7/5 office visit has been rescheduled to 7/15 @2:20, if date/time does not work, please call back to have rescheduled.

## 2024-07-10 ENCOUNTER — HOSPITAL ENCOUNTER (OUTPATIENT)
Dept: MAMMOGRAPHY | Age: 74
Discharge: HOME | End: 2024-07-13
Payer: MEDICARE

## 2024-07-10 DIAGNOSIS — N64.4 BREAST PAIN, RIGHT: ICD-10-CM

## 2024-07-10 PROCEDURE — 76642 ULTRASOUND BREAST LIMITED: CPT

## 2024-07-10 PROCEDURE — G0279 TOMOSYNTHESIS, MAMMO: HCPCS

## 2024-07-15 ENCOUNTER — OFFICE VISIT (OUTPATIENT)
Dept: UROLOGY | Age: 74
End: 2024-07-15
Payer: MEDICARE

## 2024-07-15 DIAGNOSIS — N30.10 INTERSTITIAL CYSTITIS (CHRONIC) WITHOUT HEMATURIA: ICD-10-CM

## 2024-07-15 DIAGNOSIS — Z17.0 MALIGNANT NEOPLASM OF LOWER-INNER QUADRANT OF RIGHT BREAST OF FEMALE, ESTROGEN RECEPTOR POSITIVE (HCC): ICD-10-CM

## 2024-07-15 DIAGNOSIS — C50.311 MALIGNANT NEOPLASM OF LOWER-INNER QUADRANT OF RIGHT BREAST OF FEMALE, ESTROGEN RECEPTOR POSITIVE (HCC): ICD-10-CM

## 2024-07-15 DIAGNOSIS — N39.0 URINARY TRACT INFECTION WITHOUT HEMATURIA, SITE UNSPECIFIED: ICD-10-CM

## 2024-07-15 DIAGNOSIS — N95.2 ATROPHIC VAGINITIS: ICD-10-CM

## 2024-07-15 DIAGNOSIS — N32.81 OAB (OVERACTIVE BLADDER): Primary | ICD-10-CM

## 2024-07-15 DIAGNOSIS — Z86.718 HISTORY OF DVT OF LOWER EXTREMITY: ICD-10-CM

## 2024-07-15 DIAGNOSIS — N30.00 ACUTE CYSTITIS WITHOUT HEMATURIA: ICD-10-CM

## 2024-07-15 LAB
BILIRUBIN, URINE, POC: NEGATIVE
BLOOD URINE, POC: NORMAL
GLUCOSE URINE, POC: NEGATIVE
KETONES, URINE, POC: NEGATIVE
LEUKOCYTE ESTERASE, URINE, POC: NORMAL
NITRITE, URINE, POC: POSITIVE
PH, URINE, POC: 5.5 (ref 4.6–8)
PROTEIN,URINE, POC: NEGATIVE
SPECIFIC GRAVITY, URINE, POC: 1.02 (ref 1–1.03)
URINALYSIS CLARITY, POC: NORMAL
URINALYSIS COLOR, POC: NORMAL
UROBILINOGEN, POC: NORMAL

## 2024-07-15 PROCEDURE — 1123F ACP DISCUSS/DSCN MKR DOCD: CPT | Performed by: UROLOGY

## 2024-07-15 PROCEDURE — 1036F TOBACCO NON-USER: CPT | Performed by: UROLOGY

## 2024-07-15 PROCEDURE — 1090F PRES/ABSN URINE INCON ASSESS: CPT | Performed by: UROLOGY

## 2024-07-15 PROCEDURE — G8399 PT W/DXA RESULTS DOCUMENT: HCPCS | Performed by: UROLOGY

## 2024-07-15 PROCEDURE — 3017F COLORECTAL CA SCREEN DOC REV: CPT | Performed by: UROLOGY

## 2024-07-15 PROCEDURE — 81003 URINALYSIS AUTO W/O SCOPE: CPT | Performed by: UROLOGY

## 2024-07-15 PROCEDURE — G8417 CALC BMI ABV UP PARAM F/U: HCPCS | Performed by: UROLOGY

## 2024-07-15 PROCEDURE — 99214 OFFICE O/P EST MOD 30 MIN: CPT | Performed by: UROLOGY

## 2024-07-15 PROCEDURE — G8427 DOCREV CUR MEDS BY ELIG CLIN: HCPCS | Performed by: UROLOGY

## 2024-07-15 RX ORDER — ESTRADIOL 10 UG/1
10 INSERT VAGINAL
Qty: 10 TABLET | Refills: 5 | Status: SHIPPED | OUTPATIENT
Start: 2024-07-15

## 2024-07-15 ASSESSMENT — ENCOUNTER SYMPTOMS
NAUSEA: 0
BACK PAIN: 0

## 2024-07-15 NOTE — PROGRESS NOTES
nausea.  Musculoskeletal:  Negative for back pain.      There were no vitals taken for this visit.    Urinalysis  UA - Dipstick  Results for orders placed or performed in visit on 07/15/24   AMB POC URINALYSIS DIP STICK AUTO W/O MICRO   Result Value Ref Range    Color (UA POC)      Clarity (UA POC)      Glucose, Urine, POC Negative     Bilirubin, Urine, POC Negative     KETONES, Urine, POC Negative     Specific Gravity, Urine, POC 1.020 1.001 - 1.035    Blood (UA POC) Moderate     pH, Urine, POC 5.5 4.6 - 8.0    Protein, Urine, POC Negative     Urobilinogen, POC 0.2 mg/dL     Nitrite, Urine, POC Positive     Leukocyte Esterase, Urine, POC Small    Results for orders placed or performed in visit on 01/23/23   AMB POC URINALYSIS DIP STICK AUTO W/O MICRO   Result Value Ref Range    Color, Urine, POC      Clarity, Urine, POC      Glucose, Urine, POC Negative Negative    Bilirubin, Urine, POC Negative Negative    Ketones, Urine, POC Negative Negative    Specific Gravity, Urine, POC 1.020 1.001 - 1.035    Blood, Urine, POC Small Negative    pH, Urine, POC 5.5 4.6 - 8.0    Protein, Urine, POC 30 Negative    Urobilinogen, POC 0.2     Nitrite, Urine, POC Positive Negative    Leukocyte Esterase, Urine, POC Large Negative       UA - Micro  WBC -10-15  RBC - 0  Bacteria - 0  Epith - 0    Physical Exam    Assessment and Plan    Lauren was seen today for follow-up.    Diagnoses and all orders for this visit:    OAB (overactive bladder)  -     AMB POC URINALYSIS DIP STICK AUTO W/O MICRO    Urinary tract infection without hematuria, site unspecified  -     AMB POC URINALYSIS DIP STICK AUTO W/O MICRO    Interstitial cystitis (chronic) without hematuria  -     AMB POC URINALYSIS DIP STICK AUTO W/O MICRO    Acute cystitis without hematuria  -     AMB POC URINALYSIS DIP STICK AUTO W/O MICRO    Atrophic vaginitis  -     Estradiol (VAGIFEM) 10 MCG TABS vaginal tablet; Place 1 tablet vaginally Twice a Week    History of DVT of lower

## 2024-08-28 ENCOUNTER — HOSPITAL ENCOUNTER (OUTPATIENT)
Dept: INFUSION THERAPY | Age: 74
Setting detail: INFUSION SERIES
Discharge: HOME | End: 2024-08-28
Payer: MEDICARE

## 2024-08-28 ENCOUNTER — OFFICE VISIT (OUTPATIENT)
Dept: ONCOLOGY | Age: 74
End: 2024-08-28
Payer: MEDICARE

## 2024-08-28 ENCOUNTER — HOSPITAL ENCOUNTER (OUTPATIENT)
Dept: LAB | Age: 74
Discharge: HOME | End: 2024-08-31
Payer: MEDICARE

## 2024-08-28 VITALS
OXYGEN SATURATION: 99 % | HEIGHT: 60 IN | WEIGHT: 140.5 LBS | TEMPERATURE: 98.2 F | DIASTOLIC BLOOD PRESSURE: 65 MMHG | SYSTOLIC BLOOD PRESSURE: 122 MMHG | BODY MASS INDEX: 27.58 KG/M2 | HEART RATE: 88 BPM | RESPIRATION RATE: 18 BRPM

## 2024-08-28 DIAGNOSIS — Z85.3 PERSONAL HISTORY OF MALIGNANT NEOPLASM OF BREAST: ICD-10-CM

## 2024-08-28 DIAGNOSIS — M81.0 OSTEOPOROSIS WITHOUT CURRENT PATHOLOGICAL FRACTURE, UNSPECIFIED OSTEOPOROSIS TYPE: Primary | ICD-10-CM

## 2024-08-28 DIAGNOSIS — Z85.3 PERSONAL HISTORY OF MALIGNANT NEOPLASM OF BREAST: Primary | ICD-10-CM

## 2024-08-28 DIAGNOSIS — M81.0 AGE-RELATED OSTEOPOROSIS WITHOUT CURRENT PATHOLOGICAL FRACTURE: ICD-10-CM

## 2024-08-28 LAB
ALBUMIN SERPL-MCNC: 3.7 G/DL (ref 3.2–4.6)
ALBUMIN/GLOB SERPL: 1.1 (ref 1–1.9)
ALP SERPL-CCNC: 51 U/L (ref 35–104)
ALT SERPL-CCNC: 20 U/L (ref 12–65)
ANION GAP SERPL CALC-SCNC: 10 MMOL/L (ref 9–18)
AST SERPL-CCNC: 24 U/L (ref 15–37)
BASOPHILS # BLD: 0 K/UL (ref 0–0.2)
BASOPHILS NFR BLD: 1 % (ref 0–2)
BILIRUB SERPL-MCNC: 0.5 MG/DL (ref 0–1.2)
BUN SERPL-MCNC: 28 MG/DL (ref 8–23)
CALCIUM SERPL-MCNC: 10.1 MG/DL (ref 8.8–10.2)
CHLORIDE SERPL-SCNC: 104 MMOL/L (ref 98–107)
CO2 SERPL-SCNC: 25 MMOL/L (ref 20–28)
CREAT SERPL-MCNC: 1.34 MG/DL (ref 0.6–1.1)
DIFFERENTIAL METHOD BLD: ABNORMAL
EOSINOPHIL # BLD: 0.4 K/UL (ref 0–0.8)
EOSINOPHIL NFR BLD: 7 % (ref 0.5–7.8)
ERYTHROCYTE [DISTWIDTH] IN BLOOD BY AUTOMATED COUNT: 12.7 % (ref 11.9–14.6)
GLOBULIN SER CALC-MCNC: 3.5 G/DL (ref 2.3–3.5)
GLUCOSE SERPL-MCNC: 125 MG/DL (ref 70–99)
HCT VFR BLD AUTO: 33.1 % (ref 35.8–46.3)
HGB BLD-MCNC: 10.6 G/DL (ref 11.7–15.4)
IMM GRANULOCYTES # BLD AUTO: 0 K/UL (ref 0–0.5)
IMM GRANULOCYTES NFR BLD AUTO: 0 % (ref 0–5)
LYMPHOCYTES # BLD: 1.5 K/UL (ref 0.5–4.6)
LYMPHOCYTES NFR BLD: 24 % (ref 13–44)
MCH RBC QN AUTO: 31.1 PG (ref 26.1–32.9)
MCHC RBC AUTO-ENTMCNC: 32 G/DL (ref 31.4–35)
MCV RBC AUTO: 97.1 FL (ref 82–102)
MONOCYTES # BLD: 0.5 K/UL (ref 0.1–1.3)
MONOCYTES NFR BLD: 8 % (ref 4–12)
NEUTS SEG # BLD: 3.6 K/UL (ref 1.7–8.2)
NEUTS SEG NFR BLD: 60 % (ref 43–78)
NRBC # BLD: 0 K/UL (ref 0–0.2)
PLATELET # BLD AUTO: 183 K/UL (ref 150–450)
PMV BLD AUTO: 10 FL (ref 9.4–12.3)
POTASSIUM SERPL-SCNC: 4.3 MMOL/L (ref 3.5–5.1)
PROT SERPL-MCNC: 7.2 G/DL (ref 6.3–8.2)
RBC # BLD AUTO: 3.41 M/UL (ref 4.05–5.2)
SODIUM SERPL-SCNC: 139 MMOL/L (ref 136–145)
WBC # BLD AUTO: 6 K/UL (ref 4.3–11.1)

## 2024-08-28 PROCEDURE — 99214 OFFICE O/P EST MOD 30 MIN: CPT | Performed by: INTERNAL MEDICINE

## 2024-08-28 PROCEDURE — 3074F SYST BP LT 130 MM HG: CPT | Performed by: INTERNAL MEDICINE

## 2024-08-28 PROCEDURE — 3017F COLORECTAL CA SCREEN DOC REV: CPT | Performed by: INTERNAL MEDICINE

## 2024-08-28 PROCEDURE — 1123F ACP DISCUSS/DSCN MKR DOCD: CPT | Performed by: INTERNAL MEDICINE

## 2024-08-28 PROCEDURE — G8427 DOCREV CUR MEDS BY ELIG CLIN: HCPCS | Performed by: INTERNAL MEDICINE

## 2024-08-28 PROCEDURE — 85025 COMPLETE CBC W/AUTO DIFF WBC: CPT

## 2024-08-28 PROCEDURE — 6360000002 HC RX W HCPCS: Performed by: INTERNAL MEDICINE

## 2024-08-28 PROCEDURE — 1036F TOBACCO NON-USER: CPT | Performed by: INTERNAL MEDICINE

## 2024-08-28 PROCEDURE — G8399 PT W/DXA RESULTS DOCUMENT: HCPCS | Performed by: INTERNAL MEDICINE

## 2024-08-28 PROCEDURE — 36415 COLL VENOUS BLD VENIPUNCTURE: CPT

## 2024-08-28 PROCEDURE — 3078F DIAST BP <80 MM HG: CPT | Performed by: INTERNAL MEDICINE

## 2024-08-28 PROCEDURE — G8417 CALC BMI ABV UP PARAM F/U: HCPCS | Performed by: INTERNAL MEDICINE

## 2024-08-28 PROCEDURE — 80053 COMPREHEN METABOLIC PANEL: CPT

## 2024-08-28 PROCEDURE — 2580000003 HC RX 258: Performed by: INTERNAL MEDICINE

## 2024-08-28 PROCEDURE — 96365 THER/PROPH/DIAG IV INF INIT: CPT

## 2024-08-28 PROCEDURE — 1090F PRES/ABSN URINE INCON ASSESS: CPT | Performed by: INTERNAL MEDICINE

## 2024-08-28 RX ORDER — HEPARIN 100 UNIT/ML
500 SYRINGE INTRAVENOUS PRN
OUTPATIENT
Start: 2024-08-28

## 2024-08-28 RX ORDER — ACETAMINOPHEN 325 MG/1
650 TABLET ORAL
Status: CANCELLED | OUTPATIENT
Start: 2024-08-28

## 2024-08-28 RX ORDER — EPINEPHRINE 1 MG/ML
0.3 INJECTION, SOLUTION, CONCENTRATE INTRAVENOUS PRN
OUTPATIENT
Start: 2024-08-28

## 2024-08-28 RX ORDER — ZOLEDRONIC ACID 5 MG/100ML
5 INJECTION, SOLUTION INTRAVENOUS ONCE
Status: CANCELLED
Start: 2024-08-28 | End: 2024-08-28

## 2024-08-28 RX ORDER — SODIUM CHLORIDE 0.9 % (FLUSH) 0.9 %
5-40 SYRINGE (ML) INJECTION PRN
OUTPATIENT
Start: 2024-08-28

## 2024-08-28 RX ORDER — ACETAMINOPHEN 325 MG/1
650 TABLET ORAL
OUTPATIENT
Start: 2024-08-28

## 2024-08-28 RX ORDER — ONDANSETRON 2 MG/ML
8 INJECTION INTRAMUSCULAR; INTRAVENOUS
Status: CANCELLED | OUTPATIENT
Start: 2024-08-28

## 2024-08-28 RX ORDER — DIPHENHYDRAMINE HYDROCHLORIDE 50 MG/ML
50 INJECTION INTRAMUSCULAR; INTRAVENOUS
Status: CANCELLED | OUTPATIENT
Start: 2024-08-28

## 2024-08-28 RX ORDER — FAMOTIDINE 10 MG/ML
20 INJECTION, SOLUTION INTRAVENOUS
Status: CANCELLED | OUTPATIENT
Start: 2024-08-28

## 2024-08-28 RX ORDER — SODIUM CHLORIDE 0.9 % (FLUSH) 0.9 %
5-40 SYRINGE (ML) INJECTION PRN
Status: CANCELLED | OUTPATIENT
Start: 2024-08-28

## 2024-08-28 RX ORDER — ALBUTEROL SULFATE 90 UG/1
4 AEROSOL, METERED RESPIRATORY (INHALATION) PRN
Status: CANCELLED | OUTPATIENT
Start: 2024-08-28

## 2024-08-28 RX ORDER — HEPARIN SODIUM (PORCINE) LOCK FLUSH IV SOLN 100 UNIT/ML 100 UNIT/ML
500 SOLUTION INTRAVENOUS PRN
Status: CANCELLED | OUTPATIENT
Start: 2024-08-28

## 2024-08-28 RX ORDER — ZOLEDRONIC ACID 5 MG/100ML
5 INJECTION, SOLUTION INTRAVENOUS ONCE
Start: 2024-08-28 | End: 2024-08-28

## 2024-08-28 RX ORDER — SODIUM CHLORIDE 9 MG/ML
5-250 INJECTION, SOLUTION INTRAVENOUS PRN
Status: CANCELLED | OUTPATIENT
Start: 2024-08-28

## 2024-08-28 RX ORDER — SODIUM CHLORIDE 9 MG/ML
INJECTION, SOLUTION INTRAVENOUS CONTINUOUS
Status: CANCELLED | OUTPATIENT
Start: 2024-08-28

## 2024-08-28 RX ORDER — ALBUTEROL SULFATE 90 UG/1
4 AEROSOL, METERED RESPIRATORY (INHALATION) PRN
OUTPATIENT
Start: 2024-08-28

## 2024-08-28 RX ORDER — DIPHENHYDRAMINE HYDROCHLORIDE 50 MG/ML
50 INJECTION INTRAMUSCULAR; INTRAVENOUS
OUTPATIENT
Start: 2024-08-28

## 2024-08-28 RX ORDER — ZOLEDRONIC ACID 5 MG/100ML
5 INJECTION, SOLUTION INTRAVENOUS ONCE
Status: COMPLETED | OUTPATIENT
Start: 2024-08-28 | End: 2024-08-28

## 2024-08-28 RX ORDER — SODIUM CHLORIDE 9 MG/ML
INJECTION, SOLUTION INTRAVENOUS CONTINUOUS
OUTPATIENT
Start: 2024-08-28

## 2024-08-28 RX ORDER — EPINEPHRINE 1 MG/ML
0.3 INJECTION, SOLUTION, CONCENTRATE INTRAVENOUS PRN
Status: CANCELLED | OUTPATIENT
Start: 2024-08-28

## 2024-08-28 RX ORDER — SODIUM CHLORIDE 9 MG/ML
5-250 INJECTION, SOLUTION INTRAVENOUS PRN
OUTPATIENT
Start: 2024-08-28

## 2024-08-28 RX ORDER — SODIUM CHLORIDE 0.9 % (FLUSH) 0.9 %
5-40 SYRINGE (ML) INJECTION PRN
Status: DISCONTINUED | OUTPATIENT
Start: 2024-08-28 | End: 2024-08-29 | Stop reason: HOSPADM

## 2024-08-28 RX ORDER — ONDANSETRON 2 MG/ML
8 INJECTION INTRAMUSCULAR; INTRAVENOUS
OUTPATIENT
Start: 2024-08-28

## 2024-08-28 RX ADMIN — ZOLEDRONIC ACID 5 MG: 0.05 INJECTION, SOLUTION INTRAVENOUS at 12:45

## 2024-08-28 RX ADMIN — SODIUM CHLORIDE, PRESERVATIVE FREE 10 ML: 5 INJECTION INTRAVENOUS at 12:25

## 2024-08-28 ASSESSMENT — PATIENT HEALTH QUESTIONNAIRE - PHQ9
SUM OF ALL RESPONSES TO PHQ QUESTIONS 1-9: 0
SUM OF ALL RESPONSES TO PHQ9 QUESTIONS 1 & 2: 0
2. FEELING DOWN, DEPRESSED OR HOPELESS: NOT AT ALL
1. LITTLE INTEREST OR PLEASURE IN DOING THINGS: NOT AT ALL

## 2024-08-28 NOTE — PATIENT INSTRUCTIONS
Patient Information from Today's Visit    The members of your [facility] are listed below:    Physician Provider: Carlo Bullard Medical Oncologist  Advanced Practice Clinician: Carmencita York NP  Registered Nurse: Liliana DAVILA   Navigator: N/A  Medical Assistant: Nakia HOLGUIN MA  : Marah CARDOSO   Supportive Care Services: Shi SANTOS LMSW    Diagnosis: Breast      Follow Up Instructions: Proceed with infusion today.      Treatment Summary has been discussed and given to patient:N/A      Current Labs:   Hospital Outpatient Visit on 08/28/2024   Component Date Value Ref Range Status    WBC 08/28/2024 6.0  4.3 - 11.1 K/uL Final    RBC 08/28/2024 3.41 (L)  4.05 - 5.2 M/uL Final    Hemoglobin 08/28/2024 10.6 (L)  11.7 - 15.4 g/dL Final    Hematocrit 08/28/2024 33.1 (L)  35.8 - 46.3 % Final    MCV 08/28/2024 97.1  82.0 - 102.0 FL Final    MCH 08/28/2024 31.1  26.1 - 32.9 PG Final    MCHC 08/28/2024 32.0  31.4 - 35.0 g/dL Final    RDW 08/28/2024 12.7  11.9 - 14.6 % Final    Platelets 08/28/2024 183  150 - 450 K/uL Final    MPV 08/28/2024 10.0  9.4 - 12.3 FL Final    nRBC 08/28/2024 0.00  0.0 - 0.2 K/uL Final    **Note: Absolute NRBC parameter is now reported with Hemogram**    Neutrophils % 08/28/2024 60  43 - 78 % Final    Lymphocytes % 08/28/2024 24  13 - 44 % Final    Monocytes % 08/28/2024 8  4.0 - 12.0 % Final    Eosinophils % 08/28/2024 7  0.5 - 7.8 % Final    Basophils % 08/28/2024 1  0.0 - 2.0 % Final    Immature Granulocytes % 08/28/2024 0  0.0 - 5.0 % Final    Neutrophils Absolute 08/28/2024 3.6  1.7 - 8.2 K/UL Final    Lymphocytes Absolute 08/28/2024 1.5  0.5 - 4.6 K/UL Final    Monocytes Absolute 08/28/2024 0.5  0.1 - 1.3 K/UL Final    Eosinophils Absolute 08/28/2024 0.4  0.0 - 0.8 K/UL Final    Basophils Absolute 08/28/2024 0.0  0.0 - 0.2 K/UL Final    Immature Granulocytes Absolute 08/28/2024 0.0  0.0 - 0.5 K/UL Final    Differential Type 08/28/2024 AUTOMATED    Final          Please refer to

## 2024-08-28 NOTE — PROGRESS NOTES
Negative    HER-2/YVONNE: Equivocal (2+)       7/21/14 BREAST MRI    INDICATION: New diagnosis of breast cancer    FINDINGS:    There are biopsy changes and a small hematoma in the right breast anteriorly at 6:00. There is a 1 cm enhancing mass along the posterior margin of the biopsy site compatible with residual tumor. No other areas of significant abnormal enhancement are seen  in either breast. There is no significant axillary adenopathy. There are no chest wall lesions.    IMPRESSION: 1 cm enhancing mass at 6:00 in the right breast compatible with known breast cancer. No additional lesions are seen.       8/14/14 PATHOLOGY DIAGNOSIS     A: \"RIGHT AXILLARY SENTINEL NODE\": 6 LYMPH NODES NEGATIVE FOR METASTATIC CARCINOMA. SEE COMMENT.    B: \"MASS RIGHT BREAST\": INFILTRATING DUCT CARCINOMA, LOW GRADE (WELL DIFFERENTIATED) WITH ASSOCIATED DUCTAL CARCINOMA IN SITU, HIGH GRADE (CRIBRIFORM TYPE) MEASURING UP TO 0.8 X 0.7 CM ON SLIDE. LYMPHOVASCULAR INVASION IS NOT IDENTIFIED.  SEE C, D, E, F, G AND H FOR NEGATIVE MARGINS.    C: \"POSTERIOR MARGIN, RIGHT BREAST MASS\": BREAST NEGATIVE FOR MALIGNANT TUMOR.    D: \"INFERIOR MARGIN, RIGHT BREAST MASS\": BREAST NEGATIVE FOR MALIGNANT TUMOR.    E: \"MEDIAL MARGIN, RIGHT BREAST MASS\": BREAST NEGATIVE FOR MALIGNANT TUMOR.    F: \"SUPERIOR MARGIN, RIGHT BREAST MASS\": BREAST NEGATIVE FOR MALIGNANT TUMOR.    G: \"LATERAL MARGIN, RIGHT BREAST MASS\": BREAST NEGATIVE FOR MALIGNANT TUMOR.    H: \"ANTERIOR MARGIN, RIGHT BREAST MASS\": BREAST NEGATIVE FOR MALIGNANT TUMOR                           Interim history update in A/P.       Review of Systems:       Constitutional   Denies fever or chills. Denies weight loss or appetite changes. Denies anorexia.  Denies fatigue.         HEENT   Glasses. Denies trauma, bluring vision, hearing loss, ear pain, nosebleeds, sore throat, neck  pain and ear discharge.      Skin   Denies lesions or rashes.      Lungs   Denies cough, shortness of breath, sputum  35.0 g/dL    RDW 12.7 11.9 - 14.6 %    Platelets 183 150 - 450 K/uL    MPV 10.0 9.4 - 12.3 FL    nRBC 0.00 0.0 - 0.2 K/uL    Neutrophils % 60 43 - 78 %    Lymphocytes % 24 13 - 44 %    Monocytes % 8 4.0 - 12.0 %    Eosinophils % 7 0.5 - 7.8 %    Basophils % 1 0.0 - 2.0 %    Immature Granulocytes % 0 0.0 - 5.0 %    Neutrophils Absolute 3.6 1.7 - 8.2 K/UL    Lymphocytes Absolute 1.5 0.5 - 4.6 K/UL    Monocytes Absolute 0.5 0.1 - 1.3 K/UL    Eosinophils Absolute 0.4 0.0 - 0.8 K/UL    Basophils Absolute 0.0 0.0 - 0.2 K/UL    Immature Granulocytes Absolute 0.0 0.0 - 0.5 K/UL    Differential Type AUTOMATED     Comprehensive Metabolic Panel    Collection Time: 08/28/24 10:57 AM   Result Value Ref Range    Sodium 139 136 - 145 mmol/L    Potassium 4.3 3.5 - 5.1 mmol/L    Chloride 104 98 - 107 mmol/L    CO2 25 20 - 28 mmol/L    Anion Gap 10 9 - 18 mmol/L    Glucose 125 (H) 70 - 99 mg/dL    BUN 28 (H) 8 - 23 MG/DL    Creatinine 1.34 (H) 0.60 - 1.10 MG/DL    Est, Glom Filt Rate 41 (L) >60 ml/min/1.73m2    Calcium 10.1 8.8 - 10.2 MG/DL    Total Bilirubin 0.5 0.0 - 1.2 MG/DL    ALT 20 12 - 65 U/L    AST 24 15 - 37 U/L    Alk Phosphatase 51 35 - 104 U/L    Total Protein 7.2 6.3 - 8.2 g/dL    Albumin 3.7 3.2 - 4.6 g/dL    Globulin 3.5 2.3 - 3.5 g/dL    Albumin/Globulin Ratio 1.1 1.0 - 1.9         Imaging:  No results found for this or any previous visit.    ASSESSMENT/PLAN:   Diagnosis Orders   1. Personal history of malignant neoplasm of breast        2. Age-related osteoporosis without current pathological fracture              73 y.o. F of good PS, h/o provoked DVT after long travel in 2001 without Rx and DVT post-op in 2006, found  right breast T1N0M0 breast cancer IDC ER99% PR0% HER2 2+IHC and ambiguous FISH, s/p right lumpectomy and SLND on 8/15/14. We discussed her  prognosis very good, the need of multidisciplinary Rx for the best outcome, the need for hormonal therapy for her strong ER+ with aromasin and should be

## 2024-08-28 NOTE — PROGRESS NOTES
Arrived to the Infusion [facility].  Reclast infusion completed. Patient tolerated well.   Any issues or concerns during appointment: none.  Patient aware of next lab and [facility] office visit- none at this time.   Patient instructed to call provider with temperature of 100.4 or greater or nausea/vomiting/ diarrhea or pain not controlled by medications  Discharged home ambulatory.

## 2024-09-24 ENCOUNTER — HOSPITAL ENCOUNTER (OUTPATIENT)
Dept: MAMMOGRAPHY | Age: 74
Discharge: HOME | End: 2024-09-27
Attending: FAMILY MEDICINE
Payer: MEDICARE

## 2024-09-24 DIAGNOSIS — M81.0 AGE RELATED OSTEOPOROSIS, UNSPECIFIED PATHOLOGICAL FRACTURE PRESENCE: ICD-10-CM

## 2024-09-24 PROCEDURE — 77080 DXA BONE DENSITY AXIAL: CPT

## 2024-10-07 ENCOUNTER — OFFICE VISIT (OUTPATIENT)
Dept: UROLOGY | Age: 74
End: 2024-10-07
Payer: MEDICARE

## 2024-10-07 DIAGNOSIS — N30.10 INTERSTITIAL CYSTITIS (CHRONIC) WITHOUT HEMATURIA: Primary | ICD-10-CM

## 2024-10-07 DIAGNOSIS — N32.81 OAB (OVERACTIVE BLADDER): ICD-10-CM

## 2024-10-07 DIAGNOSIS — N95.2 ATROPHIC VAGINITIS: ICD-10-CM

## 2024-10-07 PROCEDURE — 1123F ACP DISCUSS/DSCN MKR DOCD: CPT | Performed by: UROLOGY

## 2024-10-07 PROCEDURE — 99213 OFFICE O/P EST LOW 20 MIN: CPT | Performed by: UROLOGY

## 2024-10-07 PROCEDURE — G8427 DOCREV CUR MEDS BY ELIG CLIN: HCPCS | Performed by: UROLOGY

## 2024-10-07 PROCEDURE — G8399 PT W/DXA RESULTS DOCUMENT: HCPCS | Performed by: UROLOGY

## 2024-10-07 PROCEDURE — 1090F PRES/ABSN URINE INCON ASSESS: CPT | Performed by: UROLOGY

## 2024-10-07 PROCEDURE — 1036F TOBACCO NON-USER: CPT | Performed by: UROLOGY

## 2024-10-07 PROCEDURE — G8417 CALC BMI ABV UP PARAM F/U: HCPCS | Performed by: UROLOGY

## 2024-10-07 PROCEDURE — G8484 FLU IMMUNIZE NO ADMIN: HCPCS | Performed by: UROLOGY

## 2024-10-07 ASSESSMENT — ENCOUNTER SYMPTOMS
BACK PAIN: 0
NAUSEA: 0

## 2024-10-07 NOTE — PROGRESS NOTES
[facility]  [address] [place] [id number]  [phone]    Lauren Thomas  : 1950    Chief Complaint   Patient presents with    Follow-up        HPI     Lauren Thomas is a 74 y.o. female  with hx of cystocele, eosinophilic cystitis, IC.   Patient had cystocele repair with [employer]igee system on 2006. Had DVT postop.      Had a small area of exposed mesh in ., started on Estrace cream.    Started on Estrace vaginal cream. No exposed mesh when seen in 3-10.  Cystoscopy showed a grade 2 cystocele. This appeared to be distal to the previous sling. Negative MMK test.    She continued to have incontinence as well as postvoid dribbling.    On 12 she had urethrolysis, Anterior Elevate repair .      Was using Estrace and Enablex.Had been on Trimethoprim suppression but got C.diff. enterocolitis. States that she got a rash with Macrodantin once, but has taken it several times We tried Macrodantin daily in  but she got a rash again .Can take Cipro without problem.    Takes Enablex 15mg daily.Was on Estrace cream 3x/week, but stopped it in .     Was seen in , pelvic showed possible small area of exposed mesh.    Had chemotherapy for breast cancer. Also XRT.      Has had recurrent UTI.       Has occasional spotting. She is not bothered by this.    Spotting may be from exposed vaginal mesh. Cant give estrogen cream because of recent breast cancer. She does not have any sxs , will follow for now.    Continues Enablex.  Had reaction to Trimethoprim. Takes Hiprex.    Has had bloody vaginal spotting in  on 2 occasions .  Exam in  showed atrophic vaginal mucosa , no exposed mesh.   Having pain when voiding , left flank pain, occasional vaginal spotting. had pyuria by cath urine but negative culture. Still having pain with voiding and hematuria. cysto in 10-18 showed diffuse cystitis.       On 18 had cysto in OR:  Cystoscopy, hydrodistention

## 2024-10-28 DIAGNOSIS — N95.2 ATROPHIC VAGINITIS: ICD-10-CM

## 2024-10-28 RX ORDER — ESTRADIOL 10 UG/1
10 INSERT VAGINAL DAILY
Qty: 30 TABLET | Refills: 3 | Status: SHIPPED | OUTPATIENT
Start: 2024-10-28

## 2025-01-03 ENCOUNTER — OFFICE VISIT (OUTPATIENT)
Dept: UROLOGY | Age: 75
End: 2025-01-03
Payer: MEDICARE

## 2025-01-03 DIAGNOSIS — N39.0 URINARY TRACT INFECTION WITHOUT HEMATURIA, SITE UNSPECIFIED: ICD-10-CM

## 2025-01-03 DIAGNOSIS — R35.0 URINARY FREQUENCY: ICD-10-CM

## 2025-01-03 DIAGNOSIS — N95.2 ATROPHIC VAGINITIS: ICD-10-CM

## 2025-01-03 DIAGNOSIS — N32.81 OAB (OVERACTIVE BLADDER): ICD-10-CM

## 2025-01-03 DIAGNOSIS — N30.10 INTERSTITIAL CYSTITIS (CHRONIC) WITHOUT HEMATURIA: ICD-10-CM

## 2025-01-03 DIAGNOSIS — N39.0 URINARY TRACT INFECTION WITHOUT HEMATURIA, SITE UNSPECIFIED: Primary | ICD-10-CM

## 2025-01-03 LAB
BILIRUBIN, URINE, POC: NEGATIVE
BLOOD URINE, POC: NORMAL
GLUCOSE URINE, POC: NEGATIVE
KETONES, URINE, POC: NEGATIVE
LEUKOCYTE ESTERASE, URINE, POC: NORMAL
NITRITE, URINE, POC: POSITIVE
PH, URINE, POC: 6 (ref 4.6–8)
PROTEIN,URINE, POC: 30
SPECIFIC GRAVITY, URINE, POC: 1.01 (ref 1–1.03)
URINALYSIS CLARITY, POC: NORMAL
URINALYSIS COLOR, POC: NORMAL
UROBILINOGEN, POC: NORMAL

## 2025-01-03 PROCEDURE — 1159F MED LIST DOCD IN RCRD: CPT | Performed by: UROLOGY

## 2025-01-03 PROCEDURE — 1090F PRES/ABSN URINE INCON ASSESS: CPT | Performed by: UROLOGY

## 2025-01-03 PROCEDURE — G8427 DOCREV CUR MEDS BY ELIG CLIN: HCPCS | Performed by: UROLOGY

## 2025-01-03 PROCEDURE — 1123F ACP DISCUSS/DSCN MKR DOCD: CPT | Performed by: UROLOGY

## 2025-01-03 PROCEDURE — 99213 OFFICE O/P EST LOW 20 MIN: CPT | Performed by: UROLOGY

## 2025-01-03 PROCEDURE — G8399 PT W/DXA RESULTS DOCUMENT: HCPCS | Performed by: UROLOGY

## 2025-01-03 PROCEDURE — G8417 CALC BMI ABV UP PARAM F/U: HCPCS | Performed by: UROLOGY

## 2025-01-03 PROCEDURE — 81003 URINALYSIS AUTO W/O SCOPE: CPT | Performed by: UROLOGY

## 2025-01-03 PROCEDURE — 1036F TOBACCO NON-USER: CPT | Performed by: UROLOGY

## 2025-01-03 NOTE — PROGRESS NOTES
AUTO W/O MICRO    Collection Time: 01/03/25  9:16 AM   Result Value Ref Range    Color, Urine, POC Dark Yellow     Clarity, Urine, POC Turbid     Glucose, Urine, POC Negative     Bilirubin, Urine, POC Negative     Ketones, Urine, POC Negative     Specific Gravity, Urine, POC 1.015 1.001 - 1.035    Blood, Urine, POC Moderate Negative    pH, Urine, POC 6.0 4.6 - 8.0    Protein, Urine, POC 30     Urobilinogen, POC 0.2 mg/dL     Nitrite, Urine, POC Positive     Leukocyte Esterase, Urine, POC Large    Results for orders placed or performed in visit on 07/15/24   AMB POC URINALYSIS DIP STICK AUTO W/O MICRO    Collection Time: 07/15/24  2:48 PM   Result Value Ref Range    Color (UA POC)      Clarity (UA POC)      Glucose, Urine, POC Negative     Bilirubin, Urine, POC Negative     KETONES, Urine, POC Negative     Specific Gravity, Urine, POC 1.020 1.001 - 1.035    Blood (UA POC) Moderate     pH, Urine, POC 5.5 4.6 - 8.0    Protein, Urine, POC Negative     Urobilinogen, POC 0.2 mg/dL     Nitrite, Urine, POC Positive     Leukocyte Esterase, Urine, POC Small        UA - Micro  WBC -tntc  RBC - 0  Bacteria - 0  Epith - 0    Physical Exam    Assessment and Plan    Lauren \"Karine\" was seen today for 3 month follow-up.    Diagnoses and all orders for this visit:    Urinary tract infection without hematuria, site unspecified  -     AMB POC URINALYSIS DIP STICK AUTO W/O MICRO  -     Culture, Urine; Future    Urinary frequency  -     AMB POC URINALYSIS DIP STICK AUTO W/O MICRO    OAB (overactive bladder)    Atrophic vaginitis    Interstitial cystitis (chronic) without hematuria    No urinary sxs. Will get culture and call pt. She is going to contact her PCP, Dr. Melchor.       Follow-up and Dispositions    Return if symptoms worsen or fail to improve.

## 2025-01-06 ENCOUNTER — TELEPHONE (OUTPATIENT)
Dept: UROLOGY | Age: 75
End: 2025-01-06

## 2025-01-06 LAB
BACTERIA SPEC CULT: ABNORMAL
BACTERIA SPEC CULT: ABNORMAL
SERVICE CMNT-IMP: ABNORMAL

## 2025-01-06 NOTE — TELEPHONE ENCOUNTER
Has UTI but no sxs. Ask her if she is having worsning urinary sxs or dysuria.., if so, will call in antibiotic

## 2025-01-22 NOTE — ANESTHESIA PREPROCEDURE EVALUATION
Anesthetic History PONV Review of Systems / Medical History Patient summary reviewed, nursing notes reviewed and pertinent labs reviewed Pulmonary Asthma (as a child) Neuro/Psych Cardiovascular Hypertension: well controlled Exercise tolerance: >4 METS 
  
GI/Hepatic/Renal 
  
 
 
 
 
 
 Endo/Other Arthritis and cancer (breast ca, s/p surgery and chemo.) Other Findings Physical Exam 
 
Airway Mallampati: II 
TM Distance: 4 - 6 cm Neck ROM: normal range of motion Mouth opening: Normal 
 
 Cardiovascular Rhythm: regular Rate: normal 
 
 
 
 Dental 
No notable dental hx Dentition: Full upper dentures Pulmonary Breath sounds clear to auscultation Abdominal 
 
 
 
 Other Findings Anesthetic Plan ASA: 2 Anesthesia type: general 
 
 
 
 
Induction: Intravenous Anesthetic plan and risks discussed with: Patient Outpatient Nutrition Counseling - Non-Surgical Weight Loss Program    REASON FOR VISIT: Initial Non-Surgical Program    Chief Complaint:    Chief Complaint   Patient presents with    Weight Management       SUBJECTIVE:  Patient here for initial nutrition consult in non-surgical weight management program. His challenges include a sedentary lifestyle and a dislike for cooking at home, eats out often. The patient is a 70 y.o. male being seen for obesity, enrolled in Non-Surgical Weight Loss Program; [place], Height: 175.9 cm (5' 9.25\"), Weight - Scale: (!) 145.4 kg (320 lb 9.6 oz), Current Body mass index is 47 kg/m².  patient's PCP is Jama Richards MD     Comorbid Conditions:  Significant diseases affecting this patient are   Past Medical History:   Diagnosis Date    Atrial fibrillation (HCC)     Cardiac pacemaker 2/19/2014    BIV ICD Serial # BL [id number] Moderl # [id number]    Cardiomyopathy (HCC)     COPD (chronic obstructive pulmonary disease) (HCC)     Sees Dr. Dobbs    Hematoma - postoperative 02/19/2014    Pseudo aneurism post op at [facility] leftt groin    History of blood transfusion 2014    No Reaction To Blood Transfusion Received    History of cardiac cath 12/4/2013 12/13 EF30% mild LAD, CX     History of cardiovascular disorder 12/4/2013    EF 29%, mild ischemia RCA    History of cardioversion 12/6/2013 12/13 unsuccessful    History of cardioversion 5/18/15    DCCV-.     History of echocardiogram 04/01/2019    History of transesophageal echocardiography ([facility]) for monitoring 12/4/2013 12/13 no clots    Hyperlipidemia     Hypertelorism     Hypertension     Obesity     S/P ablation of atrial fibrillation 02/14/2014    for a-fib by dr. Rios    S/P cardiac cath 2/5/14    Shortness of breath on exertion     Sleep apnea     Uses CPAP    Teeth missing     Upper And Lower    Wears glasses     [place] teeth extracted     4 [place] Teeth Extracted In Past   .    Review of Systems - Review of

## 2025-02-14 ENCOUNTER — TELEPHONE (OUTPATIENT)
Age: 75
End: 2025-02-14

## (undated) DEVICE — CYSTO: Brand: MEDLINE INDUSTRIES, INC.

## (undated) DEVICE — TRAY PREP DRY W/ PREM GLV 2 APPL 6 SPNG 2 UNDPD 1 OVERWRAP

## (undated) DEVICE — PACKING GZ W2INXL6FT WVN COT VAG RADPQ

## (undated) DEVICE — BAG DRNGE 4000ML CONT IRRIG ROUNDED TEARDROP SHP DISP

## (undated) DEVICE — JAR SPEC COLL C30ML 15ML PREFILL VOL POLYPR 10% NEUT BUFF

## (undated) DEVICE — GOWN,REINFORCED,POLY,AURORA,XXLARGE,STR: Brand: MEDLINE

## (undated) DEVICE — CATHETER F BLLN 5CC 16FR 2 W HYDRGEL COAT LESS TRAUM LUB

## (undated) DEVICE — KENDALL SCD EXPRESS SLEEVES, KNEE LENGTH, LARGE: Brand: KENDALL SCD

## (undated) DEVICE — SOL IRR GLYC 1.5 % 3000ML --

## (undated) DEVICE — CYSTO/BLADDER IRRIGATION SET, REGULATING CLAMP